# Patient Record
Sex: FEMALE | Race: WHITE | Employment: OTHER
[De-identification: names, ages, dates, MRNs, and addresses within clinical notes are randomized per-mention and may not be internally consistent; named-entity substitution may affect disease eponyms.]

---

## 2017-01-18 ENCOUNTER — OFFICE VISIT (OUTPATIENT)
Facility: CLINIC | Age: 77
End: 2017-01-18

## 2017-01-18 VITALS
HEART RATE: 72 BPM | TEMPERATURE: 98.6 F | DIASTOLIC BLOOD PRESSURE: 80 MMHG | OXYGEN SATURATION: 98 % | BODY MASS INDEX: 39.46 KG/M2 | WEIGHT: 176 LBS | SYSTOLIC BLOOD PRESSURE: 136 MMHG

## 2017-01-18 DIAGNOSIS — I10 ESSENTIAL HYPERTENSION: ICD-10-CM

## 2017-01-18 DIAGNOSIS — Z00.00 ROUTINE GENERAL MEDICAL EXAMINATION AT A HEALTH CARE FACILITY: ICD-10-CM

## 2017-01-18 DIAGNOSIS — E11.42 TYPE 2 DIABETES MELLITUS WITH PERIPHERAL NEUROPATHY (HCC): ICD-10-CM

## 2017-01-18 DIAGNOSIS — I25.10 CORONARY ARTERY DISEASE INVOLVING NATIVE CORONARY ARTERY OF NATIVE HEART WITHOUT ANGINA PECTORIS: ICD-10-CM

## 2017-01-18 DIAGNOSIS — Z23 NEEDS FLU SHOT: ICD-10-CM

## 2017-01-18 DIAGNOSIS — E66.9 OBESITY (BMI 30-39.9): ICD-10-CM

## 2017-01-18 DIAGNOSIS — M15.9 PRIMARY OSTEOARTHRITIS INVOLVING MULTIPLE JOINTS: ICD-10-CM

## 2017-01-18 DIAGNOSIS — Z00.00 MEDICARE ANNUAL WELLNESS VISIT, SUBSEQUENT: Primary | ICD-10-CM

## 2017-01-18 DIAGNOSIS — E78.5 DYSLIPIDEMIA: ICD-10-CM

## 2017-01-18 PROCEDURE — 90662 IIV NO PRSV INCREASED AG IM: CPT | Performed by: FAMILY MEDICINE

## 2017-01-18 PROCEDURE — G0439 PPPS, SUBSEQ VISIT: HCPCS | Performed by: FAMILY MEDICINE

## 2017-01-18 PROCEDURE — G0008 ADMIN INFLUENZA VIRUS VAC: HCPCS | Performed by: FAMILY MEDICINE

## 2017-01-18 ASSESSMENT — LIFESTYLE VARIABLES
HOW OFTEN DO YOU HAVE SIX OR MORE DRINKS ON ONE OCCASION: 0
HOW MANY STANDARD DRINKS CONTAINING ALCOHOL DO YOU HAVE ON A TYPICAL DAY: 0
AUDIT-C TOTAL SCORE: 1
HOW OFTEN DO YOU HAVE A DRINK CONTAINING ALCOHOL: 1

## 2017-01-18 ASSESSMENT — ANXIETY QUESTIONNAIRES: GAD7 TOTAL SCORE: 0

## 2017-01-18 ASSESSMENT — PATIENT HEALTH QUESTIONNAIRE - PHQ9: SUM OF ALL RESPONSES TO PHQ QUESTIONS 1-9: 0

## 2017-01-25 RX ORDER — DILTIAZEM HYDROCHLORIDE 300 MG/1
300 CAPSULE, COATED, EXTENDED RELEASE ORAL DAILY
Qty: 30 CAPSULE | Refills: 0 | Status: SHIPPED | OUTPATIENT
Start: 2017-01-25 | End: 2017-03-21

## 2017-01-26 ENCOUNTER — TELEPHONE (OUTPATIENT)
Facility: CLINIC | Age: 77
End: 2017-01-26

## 2017-01-26 RX ORDER — DILTIAZEM HYDROCHLORIDE 180 MG/1
180 CAPSULE, EXTENDED RELEASE ORAL DAILY
COMMUNITY
End: 2018-10-29 | Stop reason: SDUPTHER

## 2017-05-19 ENCOUNTER — HOSPITAL ENCOUNTER (OUTPATIENT)
Age: 77
Discharge: HOME | End: 2017-05-19
Payer: MEDICARE

## 2017-05-19 DIAGNOSIS — M15.9 PRIMARY OSTEOARTHRITIS INVOLVING MULTIPLE JOINTS: ICD-10-CM

## 2017-05-19 DIAGNOSIS — E55.9 VITAMIN D DEFICIENCY: ICD-10-CM

## 2017-05-19 DIAGNOSIS — I10 ESSENTIAL HYPERTENSION: ICD-10-CM

## 2017-05-19 DIAGNOSIS — E78.5 DYSLIPIDEMIA: ICD-10-CM

## 2017-05-19 DIAGNOSIS — E11.42 TYPE 2 DIABETES MELLITUS WITH PERIPHERAL NEUROPATHY (HCC): ICD-10-CM

## 2017-05-19 LAB
ALBUMIN SERPL-MCNC: 3.9 G/DL (ref 3.5–5.2)
ALBUMIN/GLOBULIN RATIO: ABNORMAL (ref 1–2.5)
ALP BLD-CCNC: 82 U/L (ref 35–104)
ALT SERPL-CCNC: 21 U/L (ref 5–33)
ANION GAP SERPL CALCULATED.3IONS-SCNC: 13 MMOL/L (ref 9–17)
AST SERPL-CCNC: 23 U/L
BILIRUB SERPL-MCNC: 0.84 MG/DL (ref 0.3–1.2)
BUN BLDV-MCNC: 10 MG/DL (ref 8–23)
BUN/CREAT BLD: ABNORMAL (ref 9–20)
CALCIUM SERPL-MCNC: 9.3 MG/DL (ref 8.6–10.4)
CHLORIDE BLD-SCNC: 101 MMOL/L (ref 98–107)
CHOLESTEROL/HDL RATIO: 2.7
CHOLESTEROL: 133 MG/DL
CO2: 27 MMOL/L (ref 20–31)
CREAT SERPL-MCNC: 0.51 MG/DL (ref 0.5–0.9)
GFR AFRICAN AMERICAN: >60 ML/MIN
GFR NON-AFRICAN AMERICAN: >60 ML/MIN
GFR SERPL CREATININE-BSD FRML MDRD: ABNORMAL ML/MIN/{1.73_M2}
GFR SERPL CREATININE-BSD FRML MDRD: ABNORMAL ML/MIN/{1.73_M2}
GLUCOSE BLD-MCNC: 145 MG/DL (ref 70–99)
HCT VFR BLD CALC: 40.2 % (ref 36–46)
HDLC SERPL-MCNC: 49 MG/DL
HEMOGLOBIN: 13.7 G/DL (ref 12–16)
LDL CHOLESTEROL: 64 MG/DL (ref 0–130)
MCH RBC QN AUTO: 30 PG (ref 26–34)
MCHC RBC AUTO-ENTMCNC: 34 G/DL (ref 31–37)
MCV RBC AUTO: 88.4 FL (ref 80–100)
PDW BLD-RTO: 14.2 % (ref 11.5–14.9)
PLATELET # BLD: 314 K/UL (ref 150–450)
PMV BLD AUTO: 8.8 FL (ref 6–12)
POTASSIUM SERPL-SCNC: 4.2 MMOL/L (ref 3.7–5.3)
RBC # BLD: 4.55 M/UL (ref 4–5.2)
SODIUM BLD-SCNC: 141 MMOL/L (ref 135–144)
TOTAL PROTEIN: 6.9 G/DL (ref 6.4–8.3)
TRIGL SERPL-MCNC: 100 MG/DL
VITAMIN D 25-HYDROXY: 36.8 NG/ML (ref 30–100)
VLDLC SERPL CALC-MCNC: NORMAL MG/DL (ref 1–30)
WBC # BLD: 10.4 K/UL (ref 3.5–11)

## 2017-05-19 PROCEDURE — 82306 VITAMIN D 25 HYDROXY: CPT

## 2017-05-19 PROCEDURE — 80053 COMPREHEN METABOLIC PANEL: CPT

## 2017-05-19 PROCEDURE — 36415 COLL VENOUS BLD VENIPUNCTURE: CPT

## 2017-05-19 PROCEDURE — 85027 COMPLETE CBC AUTOMATED: CPT

## 2017-05-19 PROCEDURE — 80061 LIPID PANEL: CPT

## 2018-08-10 ENCOUNTER — OFFICE VISIT (OUTPATIENT)
Dept: PODIATRY | Age: 78
End: 2018-08-10
Payer: MEDICARE

## 2018-08-10 VITALS
WEIGHT: 172 LBS | DIASTOLIC BLOOD PRESSURE: 73 MMHG | BODY MASS INDEX: 34.68 KG/M2 | SYSTOLIC BLOOD PRESSURE: 172 MMHG | HEIGHT: 59 IN | HEART RATE: 72 BPM

## 2018-08-10 DIAGNOSIS — E11.42 TYPE 2 DIABETES MELLITUS WITH PERIPHERAL NEUROPATHY (HCC): ICD-10-CM

## 2018-08-10 DIAGNOSIS — M79.674 PAIN OF TOES OF BOTH FEET: ICD-10-CM

## 2018-08-10 DIAGNOSIS — M79.675 PAIN OF TOES OF BOTH FEET: ICD-10-CM

## 2018-08-10 DIAGNOSIS — B35.1 ONYCHOMYCOSIS OF TOENAIL: Primary | ICD-10-CM

## 2018-08-10 PROCEDURE — 99999 PR OFFICE/OUTPT VISIT,PROCEDURE ONLY: CPT | Performed by: PODIATRIST

## 2018-08-10 PROCEDURE — 11721 DEBRIDE NAIL 6 OR MORE: CPT | Performed by: PODIATRIST

## 2018-08-13 ASSESSMENT — ENCOUNTER SYMPTOMS
NAUSEA: 0
COLOR CHANGE: 0
SHORTNESS OF BREATH: 0
DIARRHEA: 0
BACK PAIN: 0

## 2018-08-27 ENCOUNTER — NURSE ONLY (OUTPATIENT)
Dept: PODIATRY | Age: 78
End: 2018-08-27

## 2018-08-27 DIAGNOSIS — E11.42 TYPE 2 DIABETES MELLITUS WITH PERIPHERAL NEUROPATHY (HCC): Primary | ICD-10-CM

## 2018-09-13 ENCOUNTER — NURSE ONLY (OUTPATIENT)
Dept: PODIATRY | Age: 78
End: 2018-09-13
Payer: MEDICARE

## 2018-09-13 DIAGNOSIS — L84 PRE-ULCERATIVE CORN OR CALLOUS: ICD-10-CM

## 2018-09-13 DIAGNOSIS — E11.42 TYPE 2 DIABETES MELLITUS WITH PERIPHERAL NEUROPATHY (HCC): Primary | ICD-10-CM

## 2018-09-13 PROCEDURE — A5500 DIAB SHOE FOR DENSITY INSERT: HCPCS | Performed by: PODIATRIST

## 2018-09-13 PROCEDURE — A5513 MULTI DEN INSERT CUSTOM MOLD: HCPCS | Performed by: PODIATRIST

## 2018-09-13 NOTE — PROGRESS NOTES
Dispense 1 pair of Diabetic Shoes with 3 pairs of custom inserts. Appropriate paperwork was obtained from the primary care physician and the patient was fitted in the office.

## 2018-10-26 ENCOUNTER — HOSPITAL ENCOUNTER (OUTPATIENT)
Age: 78
Setting detail: SPECIMEN
Discharge: HOME | End: 2018-10-26
Payer: MEDICARE

## 2018-10-26 DIAGNOSIS — E11.9 TYPE 2 DIABETES MELLITUS WITHOUT COMPLICATION, WITHOUT LONG-TERM CURRENT USE OF INSULIN (HCC): ICD-10-CM

## 2018-10-26 DIAGNOSIS — E78.5 DYSLIPIDEMIA: ICD-10-CM

## 2018-10-26 DIAGNOSIS — I10 ESSENTIAL HYPERTENSION: ICD-10-CM

## 2018-10-26 DIAGNOSIS — E55.9 VITAMIN D DEFICIENCY: ICD-10-CM

## 2018-10-26 LAB
ALBUMIN SERPL-MCNC: 3.8 G/DL (ref 3.5–5.2)
ALBUMIN/GLOBULIN RATIO: 1.4 (ref 1–2.5)
ALP BLD-CCNC: 78 U/L (ref 35–104)
ALT SERPL-CCNC: 22 U/L (ref 5–33)
ANION GAP SERPL CALCULATED.3IONS-SCNC: 10 MMOL/L (ref 9–17)
AST SERPL-CCNC: 17 U/L
BILIRUB SERPL-MCNC: 0.6 MG/DL (ref 0.3–1.2)
BILIRUBIN DIRECT: 0.16 MG/DL
BILIRUBIN, INDIRECT: 0.44 MG/DL (ref 0–1)
BUN BLDV-MCNC: 17 MG/DL (ref 8–23)
CALCIUM SERPL-MCNC: 9.5 MG/DL (ref 8.6–10.4)
CHLORIDE BLD-SCNC: 100 MMOL/L (ref 98–107)
CHOLESTEROL/HDL RATIO: 2.9
CHOLESTEROL: 123 MG/DL
CO2: 27 MMOL/L (ref 20–31)
CREAT SERPL-MCNC: 0.54 MG/DL (ref 0.5–0.9)
GFR AFRICAN AMERICAN: >60 ML/MIN
GFR NON-AFRICAN AMERICAN: >60 ML/MIN
GFR SERPL CREATININE-BSD FRML MDRD: ABNORMAL ML/MIN/{1.73_M2}
GFR SERPL CREATININE-BSD FRML MDRD: ABNORMAL ML/MIN/{1.73_M2}
GLUCOSE BLD-MCNC: 154 MG/DL (ref 70–99)
HCT VFR BLD CALC: 41.8 % (ref 36.3–47.1)
HDLC SERPL-MCNC: 42 MG/DL
HEMOGLOBIN: 13.4 G/DL (ref 11.9–15.1)
LDL CHOLESTEROL: 69 MG/DL (ref 0–130)
MCH RBC QN AUTO: 30 PG (ref 25.2–33.5)
MCHC RBC AUTO-ENTMCNC: 32.1 G/DL (ref 28.4–34.8)
MCV RBC AUTO: 93.7 FL (ref 82.6–102.9)
NRBC AUTOMATED: 0 PER 100 WBC
PDW BLD-RTO: 14.6 % (ref 11.8–14.4)
PLATELET # BLD: 326 K/UL (ref 138–453)
PMV BLD AUTO: 10.8 FL (ref 8.1–13.5)
POTASSIUM SERPL-SCNC: 4.4 MMOL/L (ref 3.7–5.3)
RBC # BLD: 4.46 M/UL (ref 3.95–5.11)
SODIUM BLD-SCNC: 137 MMOL/L (ref 135–144)
TOTAL PROTEIN: 6.6 G/DL (ref 6.4–8.3)
TRIGL SERPL-MCNC: 62 MG/DL
VITAMIN D 25-HYDROXY: 44.8 NG/ML (ref 30–100)
VLDLC SERPL CALC-MCNC: NORMAL MG/DL (ref 1–30)
WBC # BLD: 10.3 K/UL (ref 3.5–11.3)

## 2018-11-05 ENCOUNTER — OFFICE VISIT (OUTPATIENT)
Dept: PODIATRY | Age: 78
End: 2018-11-05
Payer: MEDICARE

## 2018-11-05 VITALS — HEIGHT: 59 IN | BODY MASS INDEX: 35.48 KG/M2 | WEIGHT: 176 LBS

## 2018-11-05 DIAGNOSIS — M79.675 PAIN OF TOES OF BOTH FEET: ICD-10-CM

## 2018-11-05 DIAGNOSIS — B35.1 ONYCHOMYCOSIS OF TOENAIL: Primary | ICD-10-CM

## 2018-11-05 DIAGNOSIS — M79.674 PAIN OF TOES OF BOTH FEET: ICD-10-CM

## 2018-11-05 DIAGNOSIS — E11.42 TYPE 2 DIABETES MELLITUS WITH PERIPHERAL NEUROPATHY (HCC): ICD-10-CM

## 2018-11-05 PROCEDURE — 99999 PR OFFICE/OUTPT VISIT,PROCEDURE ONLY: CPT | Performed by: PODIATRIST

## 2018-11-05 PROCEDURE — 11721 DEBRIDE NAIL 6 OR MORE: CPT | Performed by: PODIATRIST

## 2018-11-08 ASSESSMENT — ENCOUNTER SYMPTOMS
COLOR CHANGE: 0
BACK PAIN: 0
SHORTNESS OF BREATH: 0
DIARRHEA: 0
NAUSEA: 0

## 2018-11-09 NOTE — PROGRESS NOTES
SUBJECTIVE: Jose King is a 69 y.o. female who returns to the office with chief complaint of painful fungal toenails. Patient relates toe nails are thickened/difficult to trim as well as painful with ambulation and with shoe gear. Chief Complaint   Patient presents with    Nail Problem     painful nails/trim/DFC    Other     pcp Mima Villarreal/last visit 10/29/18     Review of Systems   Constitutional: Negative for activity change, appetite change, chills, diaphoresis, fatigue and fever. Respiratory: Negative for shortness of breath. Cardiovascular: Negative for leg swelling. Gastrointestinal: Negative for diarrhea and nausea. Endocrine: Negative for cold intolerance, heat intolerance and polyuria. Musculoskeletal: Positive for arthralgias. Negative for back pain, gait problem, joint swelling and myalgias. Skin: Negative for color change, pallor, rash and wound. Allergic/Immunologic: Negative for environmental allergies and food allergies. Neurological: Negative for dizziness, weakness, light-headedness and numbness. Hematological: Does not bruise/bleed easily. Psychiatric/Behavioral: Negative for behavioral problems, confusion and self-injury. The patient is not nervous/anxious. OBJECTIVE: Clinical evaluation of patient reveals nails 1,2,3,4,5 of the right foot and nails 1,2,3,4,5, of the left foot to present with thickness, elongation, discoloration, brittleness, and subungual debris. There was pain with palpation and debridement of the toenails of the bilateral feet. No open lesions noted to either foot today. The right DP pulse is palpable. The left DP pulse is palpable. The right PT pulse is palpable. The left PT pulse is palpable. Protective sensation is present to the right plantar foot as noted with a 5.07 [place] monofilament. Protective sensation is present to the left plantar foot as noted with a 5.07 [place] monofilament.    Glucose:

## 2019-07-17 ENCOUNTER — HOSPITAL ENCOUNTER (OUTPATIENT)
Facility: CLINIC | Age: 79
Discharge: HOME | End: 2019-07-19
Payer: MEDICARE

## 2019-07-17 ENCOUNTER — HOSPITAL ENCOUNTER (OUTPATIENT)
Dept: GENERAL RADIOLOGY | Facility: CLINIC | Age: 79
Discharge: HOME | End: 2019-07-19
Payer: MEDICARE

## 2019-07-17 DIAGNOSIS — J40 BRONCHITIS: ICD-10-CM

## 2019-07-17 PROCEDURE — 71046 X-RAY EXAM CHEST 2 VIEWS: CPT

## 2019-12-18 ENCOUNTER — OFFICE VISIT (OUTPATIENT)
Dept: DERMATOLOGY | Age: 79
End: 2019-12-18
Payer: MEDICARE

## 2019-12-18 VITALS
HEART RATE: 68 BPM | DIASTOLIC BLOOD PRESSURE: 79 MMHG | OXYGEN SATURATION: 95 % | HEIGHT: 59 IN | BODY MASS INDEX: 31.77 KG/M2 | WEIGHT: 157.6 LBS | SYSTOLIC BLOOD PRESSURE: 175 MMHG

## 2019-12-18 DIAGNOSIS — L30.9 HAND DERMATITIS: Primary | ICD-10-CM

## 2019-12-18 PROCEDURE — 99202 OFFICE O/P NEW SF 15 MIN: CPT | Performed by: DERMATOLOGY

## 2019-12-18 PROCEDURE — 1090F PRES/ABSN URINE INCON ASSESS: CPT | Performed by: DERMATOLOGY

## 2019-12-18 PROCEDURE — G8482 FLU IMMUNIZE ORDER/ADMIN: HCPCS | Performed by: DERMATOLOGY

## 2019-12-18 PROCEDURE — G8598 ASA/ANTIPLAT THER USED: HCPCS | Performed by: DERMATOLOGY

## 2019-12-18 PROCEDURE — 4040F PNEUMOC VAC/ADMIN/RCVD: CPT | Performed by: DERMATOLOGY

## 2019-12-18 PROCEDURE — 1123F ACP DISCUSS/DSCN MKR DOCD: CPT | Performed by: DERMATOLOGY

## 2019-12-18 PROCEDURE — 1036F TOBACCO NON-USER: CPT | Performed by: DERMATOLOGY

## 2019-12-18 PROCEDURE — G8427 DOCREV CUR MEDS BY ELIG CLIN: HCPCS | Performed by: DERMATOLOGY

## 2019-12-18 PROCEDURE — G8399 PT W/DXA RESULTS DOCUMENT: HCPCS | Performed by: DERMATOLOGY

## 2019-12-18 PROCEDURE — G8417 CALC BMI ABV UP PARAM F/U: HCPCS | Performed by: DERMATOLOGY

## 2019-12-18 RX ORDER — PREDNISONE 20 MG/1
TABLET ORAL
Qty: 25 TABLET | Refills: 0 | Status: SHIPPED | OUTPATIENT
Start: 2019-12-18 | End: 2020-06-16 | Stop reason: ALTCHOICE

## 2019-12-18 RX ORDER — BETAMETHASONE DIPROPIONATE 0.5 MG/G
OINTMENT TOPICAL
Qty: 50 G | Refills: 2 | Status: SHIPPED | OUTPATIENT
Start: 2019-12-18 | End: 2021-03-29

## 2019-12-18 RX ORDER — TACROLIMUS 1 MG/G
OINTMENT TOPICAL
Qty: 30 G | Refills: 2 | Status: SHIPPED | OUTPATIENT
Start: 2019-12-18 | End: 2021-03-29

## 2019-12-19 ENCOUNTER — TELEPHONE (OUTPATIENT)
Dept: DERMATOLOGY | Age: 79
End: 2019-12-19

## 2019-12-19 DIAGNOSIS — L30.9 HAND DERMATITIS: Primary | ICD-10-CM

## 2019-12-19 RX ORDER — BETAMETHASONE DIPROPIONATE 0.5 MG/G
CREAM TOPICAL
Qty: 50 G | Refills: 2 | Status: SHIPPED | OUTPATIENT
Start: 2019-12-19 | End: 2020-01-18

## 2020-01-30 ENCOUNTER — OFFICE VISIT (OUTPATIENT)
Dept: DERMATOLOGY | Age: 80
End: 2020-01-30
Payer: MEDICARE

## 2020-01-30 VITALS
DIASTOLIC BLOOD PRESSURE: 71 MMHG | SYSTOLIC BLOOD PRESSURE: 161 MMHG | WEIGHT: 158 LBS | HEART RATE: 68 BPM | HEIGHT: 59 IN | BODY MASS INDEX: 31.85 KG/M2

## 2020-01-30 PROCEDURE — G8482 FLU IMMUNIZE ORDER/ADMIN: HCPCS | Performed by: DERMATOLOGY

## 2020-01-30 PROCEDURE — G8399 PT W/DXA RESULTS DOCUMENT: HCPCS | Performed by: DERMATOLOGY

## 2020-01-30 PROCEDURE — 1036F TOBACCO NON-USER: CPT | Performed by: DERMATOLOGY

## 2020-01-30 PROCEDURE — 4040F PNEUMOC VAC/ADMIN/RCVD: CPT | Performed by: DERMATOLOGY

## 2020-01-30 PROCEDURE — 1090F PRES/ABSN URINE INCON ASSESS: CPT | Performed by: DERMATOLOGY

## 2020-01-30 PROCEDURE — 99213 OFFICE O/P EST LOW 20 MIN: CPT | Performed by: DERMATOLOGY

## 2020-01-30 PROCEDURE — G8427 DOCREV CUR MEDS BY ELIG CLIN: HCPCS | Performed by: DERMATOLOGY

## 2020-01-30 PROCEDURE — 1123F ACP DISCUSS/DSCN MKR DOCD: CPT | Performed by: DERMATOLOGY

## 2020-01-30 PROCEDURE — G8417 CALC BMI ABV UP PARAM F/U: HCPCS | Performed by: DERMATOLOGY

## 2020-01-30 RX ORDER — BETAMETHASONE DIPROPIONATE 0.5 MG/G
CREAM TOPICAL
Qty: 45 G | Refills: 2 | Status: SHIPPED | OUTPATIENT
Start: 2020-01-30 | End: 2020-04-13 | Stop reason: SDUPTHER

## 2020-01-30 NOTE — PROGRESS NOTES
Dermatology Patient Note  [facility] PHYSICIANS  [facility] DERMATOLOG[facility]  [address] [address]  Dept: [phone]  Dept Fax: [phone]      VISITDATE: 1/30/2020   REFERRING PROVIDER: No ref. provider found      Lana Mccormack is a 67 y.o. female  who presents today in the office for:    Follow-up (finished oral steroid; using ointment that is in \"purple\" tube twice daily; feels ointment works better than cream)      HISTORY OF PRESENT ILLNESS:  Patient presents for f/u hand dermatitis  Interval history: almost completely clear, only a few irritated areas left on right hand  Current treatment and adherence: finished steroid taper, using betamethasone ointment currently. Protopic was not as helpful. The beta ointment was very expensive (she spent nearly $300 on initial prescriptions), but the beta cream I sent in as an alternative the day after her appt was cheaper. She is still using the ointment currently but has a tube of cream.  Prior treatments tried: IMK and clobetasol      CURRENT MEDICATIONS:   Current Outpatient Medications   Medication Sig Dispense Refill    betamethasone dipropionate (DIPROLENE) 0.05 % cream Apply topically 2 times daily. 45 g 2    Blood Glucose Monitoring Suppl (ACCU-CHEK GRAYSON PLUS) w/Device KIT TEST ONE TIME DAILY 1 kit 0    tiZANidine (ZANAFLEX) 4 MG tablet TAKE 1 TABLET THREE TIMES DAILY AS NEEDED 270 tablet 0    SITagliptin (JANUVIA) 100 MG tablet TAKE 1 TABLET BY MOUTH DAILY 90 tablet 1    blood glucose monitor strips Test 2 times a day & as needed for symptoms of irregular blood glucose.  100 strip 0    ACCU-CHEK FASTCLIX LANCETS MISC TEST EVERY  each 3    blood glucose test strips (ACCU-CHEK SMARTVIEW) strip TEST EVERY  strip 3    aspirin 81 MG tablet Take 81 mg by mouth daily      Alcohol Swabs (B-D SINGLE USE SWABS REGULAR) PADS USE AS DIRECTED 300 each 5    metFORMIN (GLUCOPHAGE-XR) 500 MG extended release tablet TAKE 2 malaise. PHYSICAL EXAM:   BP (!) 161/71 (Site: Left Upper Arm, Position: Sitting, Cuff Size: Large Adult)   Pulse 68   Ht 4' 11\" (1.499 m)   Wt 158 lb (71.7 kg)   BMI 31.91 kg/m²     General Exam:  General Appearance: No acute distress, Well nourished     Neuro: Alert and oriented to person, place and time  Psych: Normal affect   Lymph Node: Not performed    Cutaneous Exam: Performed as documented in clinic note below. Sun-exposed skin, which includes the head/face, neck, both arms, digits and/or nails was examined. Pertinent Physical Exam Findings:  Physical Exam  Right hand with thin scaly plaque in palm, otherwise clear    Photo surveillance performed: No    Medical Necessity of Exam Performed:   Distribution of patient concerns    Additional Diagnostic Testing performed during exam: Not performed ,  Not performed    ASSESSMENT:   Diagnosis Orders   1. Hand dermatitis  betamethasone dipropionate (DIPROLENE) 0.05 % cream       Plan of Action is as Follows:  Assessment   1. Hand dermatitis, nearly clear  - continue betamethasone and protopic as needed for active rash only  - f/u 3 months. If inadequate control will discuss dupixent  - betamethasone dipropionate (DIPROLENE) 0.05 % cream; Apply topically 2 times daily. Dispense: 45 g; Refill: 2    RTC 3 months            Patient Instructions   -Continue the Betamethasone  -Continue the Protopic  -Follow up in 3 months      Follow-up: No follow-ups on file. This note was created with the assistance of a speech-recognition program.  Although the intention is to generate a document that actually reflects the content of the visit, no guarantees can be provided that every mistake has been identified and corrected byediting.     Electronically signed by Paige Mcleod MD on 1/30/20 at 3:02 PM

## 2020-04-13 ENCOUNTER — TELEPHONE (OUTPATIENT)
Dept: DERMATOLOGY | Age: 80
End: 2020-04-13

## 2020-04-13 RX ORDER — BETAMETHASONE DIPROPIONATE 0.5 MG/G
CREAM TOPICAL
Qty: 45 G | Refills: 2 | Status: SHIPPED | OUTPATIENT
Start: 2020-04-13 | End: 2020-04-13

## 2020-04-13 RX ORDER — BETAMETHASONE DIPROPIONATE 0.5 MG/G
CREAM TOPICAL
Qty: 135 G | Refills: 2 | Status: SHIPPED | OUTPATIENT
Start: 2020-04-13 | End: 2021-03-29

## 2020-04-13 NOTE — TELEPHONE ENCOUNTER
Pt needs betamethasone refill to [facility] mail order pharmacy (90 day please). States her hands are cracking and itching. I discussed her wearing cotton gloves--she does that regularly. Discussed wearing rubber gloves over cotton gloves when doing dishes. Stated understanding. Also advised her to keep anti itch lotion she uses in the fridge and apply cold. She will try that as well. She does not have a smartphone--in fact she lost her last cell phone. She will not be able to do VV and I advised her her upcoming appt will be cancelled and we will call to r/s in office visit once covid protocols are lifted. Stated understanding.

## 2020-06-01 ENCOUNTER — HOSPITAL ENCOUNTER (OUTPATIENT)
Age: 80
Setting detail: SPECIMEN
Discharge: HOME | End: 2020-06-01
Payer: MEDICARE

## 2020-06-01 LAB
ALBUMIN SERPL-MCNC: 3.8 G/DL (ref 3.5–5.2)
ALBUMIN/GLOBULIN RATIO: 1.5 (ref 1–2.5)
ALP BLD-CCNC: 76 U/L (ref 35–104)
ALT SERPL-CCNC: 18 U/L (ref 5–33)
ANION GAP SERPL CALCULATED.3IONS-SCNC: 14 MMOL/L (ref 9–17)
AST SERPL-CCNC: 20 U/L
BILIRUB SERPL-MCNC: 0.67 MG/DL (ref 0.3–1.2)
BUN BLDV-MCNC: 15 MG/DL (ref 8–23)
BUN/CREAT BLD: ABNORMAL (ref 9–20)
CALCIUM SERPL-MCNC: 9.4 MG/DL (ref 8.6–10.4)
CHLORIDE BLD-SCNC: 99 MMOL/L (ref 98–107)
CHOLESTEROL/HDL RATIO: 2.9
CHOLESTEROL: 141 MG/DL
CO2: 26 MMOL/L (ref 20–31)
CREAT SERPL-MCNC: 0.52 MG/DL (ref 0.5–0.9)
GFR AFRICAN AMERICAN: >60 ML/MIN
GFR NON-AFRICAN AMERICAN: >60 ML/MIN
GFR SERPL CREATININE-BSD FRML MDRD: ABNORMAL ML/MIN/{1.73_M2}
GFR SERPL CREATININE-BSD FRML MDRD: ABNORMAL ML/MIN/{1.73_M2}
GLUCOSE BLD-MCNC: 112 MG/DL (ref 70–99)
HCT VFR BLD CALC: 39 % (ref 36.3–47.1)
HDLC SERPL-MCNC: 49 MG/DL
HEMOGLOBIN: 12.6 G/DL (ref 11.9–15.1)
LDL CHOLESTEROL: 67 MG/DL (ref 0–130)
MCH RBC QN AUTO: 30.6 PG (ref 25.2–33.5)
MCHC RBC AUTO-ENTMCNC: 32.3 G/DL (ref 28.4–34.8)
MCV RBC AUTO: 94.7 FL (ref 82.6–102.9)
NRBC AUTOMATED: 0 PER 100 WBC
PDW BLD-RTO: 13.6 % (ref 11.8–14.4)
PLATELET # BLD: 288 K/UL (ref 138–453)
PMV BLD AUTO: 11.3 FL (ref 8.1–13.5)
POTASSIUM SERPL-SCNC: 4.1 MMOL/L (ref 3.7–5.3)
RBC # BLD: 4.12 M/UL (ref 3.95–5.11)
SODIUM BLD-SCNC: 139 MMOL/L (ref 135–144)
TOTAL PROTEIN: 6.3 G/DL (ref 6.4–8.3)
TRIGL SERPL-MCNC: 124 MG/DL
VLDLC SERPL CALC-MCNC: NORMAL MG/DL (ref 1–30)
WBC # BLD: 7.6 K/UL (ref 3.5–11.3)

## 2020-06-16 ENCOUNTER — OFFICE VISIT (OUTPATIENT)
Dept: DERMATOLOGY | Age: 80
End: 2020-06-16
Payer: MEDICARE

## 2020-06-16 VITALS
DIASTOLIC BLOOD PRESSURE: 69 MMHG | SYSTOLIC BLOOD PRESSURE: 156 MMHG | WEIGHT: 156.6 LBS | BODY MASS INDEX: 31.57 KG/M2 | HEART RATE: 87 BPM | HEIGHT: 59 IN | OXYGEN SATURATION: 97 % | TEMPERATURE: 97.3 F

## 2020-06-16 PROCEDURE — 1123F ACP DISCUSS/DSCN MKR DOCD: CPT | Performed by: DERMATOLOGY

## 2020-06-16 PROCEDURE — 1036F TOBACCO NON-USER: CPT | Performed by: DERMATOLOGY

## 2020-06-16 PROCEDURE — G8417 CALC BMI ABV UP PARAM F/U: HCPCS | Performed by: DERMATOLOGY

## 2020-06-16 PROCEDURE — 4040F PNEUMOC VAC/ADMIN/RCVD: CPT | Performed by: DERMATOLOGY

## 2020-06-16 PROCEDURE — 99212 OFFICE O/P EST SF 10 MIN: CPT | Performed by: DERMATOLOGY

## 2020-06-16 PROCEDURE — 1090F PRES/ABSN URINE INCON ASSESS: CPT | Performed by: DERMATOLOGY

## 2020-06-16 PROCEDURE — G8427 DOCREV CUR MEDS BY ELIG CLIN: HCPCS | Performed by: DERMATOLOGY

## 2020-06-16 PROCEDURE — G8399 PT W/DXA RESULTS DOCUMENT: HCPCS | Performed by: DERMATOLOGY

## 2020-06-16 NOTE — PATIENT INSTRUCTIONS
Hand washing and moisturizin. Use lukewarm water  2. Use as little soap as necessary. Dove bar soap or cetaphil liquid cleansers are safe. 3. After washing, pat hands dry, especially between the fingers  4. Within 3 minutes of drying hands, apply Vaseline or Aquaphor ointment. It is helpful to have a supply next to every sink, next to the bed, in the car, and at work. 5. Moisturizers should be applied to the hands at least 10 times per day. 6. Do not use washcloths and do not rub or scrub the hands    When hands must be in water:  1. Wear cotton gloves under vinyl gloves  2. Do not use hot water. 3. Restrict hand exposure to water to less than 15 minutes at a time. 4. Use running water rather than submerge hands into water. Patient Information  DUPIXENT® (DU-pix-ent) (dupilumab) Injection,  for subcutaneous use    What is DUPIXENT? ? Willie Pinna is a prescription medicine used to treat adults with moderate-to-severe atopic dermatitis (eczema) that is not well controlled  with prescription therapies used on the skin (topical), or who cannot use topical therapies. ? Willie Pinna can be used with or without topical corticosteroids. ? It is not known if DUPIXENT is safe and effective in children. Do not use DUPIXENT if you are allergic to dupilumab or to any of the ingredients in Willie Pinna. See the end of this leaflet for a complete  list of ingredients in Willie Pinna. Before using Willie Pinna, tell your healthcare provider about all your medical conditions, including if you:  ? have eye problems  ? have a parasitic (helminth) infection  ? have asthma  ? are scheduled to receive any vaccinations. You should not receive a ?live vaccine? if you are treated with Willie Pinna. ? are pregnant or plan to become pregnant. It is not known whether Willie Pinna will harm your unborn baby. ? are breastfeeding or plan to breastfeed. It is not known whether Willie Pinna passes into your breast milk.   Tell your healthcare provider about

## 2020-06-16 NOTE — PROGRESS NOTES
(dupilumab) Injection,  for subcutaneous use    What is DUPIXENT? ? Safford Romp is a prescription medicine used to treat adults with moderate-to-severe atopic dermatitis (eczema) that is not well controlled  with prescription therapies used on the skin (topical), or who cannot use topical therapies. ? Safford Romp can be used with or without topical corticosteroids. ? It is not known if DUPIXENT is safe and effective in children. Do not use DUPIXENT if you are allergic to dupilumab or to any of the ingredients in Safford Romp. See the end of this leaflet for a complete  list of ingredients in Safford Romp. Before using Safford Romp, tell your healthcare provider about all your medical conditions, including if you:  ? have eye problems  ? have a parasitic (helminth) infection  ? have asthma  ? are scheduled to receive any vaccinations. You should not receive a ?live vaccine? if you are treated with Safford Romp. ? are pregnant or plan to become pregnant. It is not known whether Safford Romp will harm your unborn baby. ? are breastfeeding or plan to breastfeed. It is not known whether Safford Romp passes into your breast milk. Tell your healthcare provider about all of the medicines you take, including prescription and over-the-counter medicines, vitamins, and herbal  supplements. If you have asthma and are taking asthma medicines, do not change or stop your asthma medicine without talking to your  healthcare provider. How should I use DUPIXENT? ? See the detailed ? Instructions for Use? that comes with Safford Romp for information on how to prepare and inject DUPIXENT and  how to properly store and throw away (dispose of) used DUPIXENT pre-filled syringes. ? Use DUPIXENT exactly as prescribed by your healthcare provider. ? Safford Romp comes as a single-dose pre-filled syringe with needle shield. ? Safford Romp is given as an injection under the skin (subcutaneous injection).   ? If your healthcare provider decides that you or a caregiver can give the injections of DUPIXENT, you or your caregiver should receive  training on the right way to prepare and inject Kyleview. Do not try to inject Kyleview until you have been shown the right way by  your healthcare provider. ? If you miss a dose of DUPIXENT, give the injection within 7 days from the missed dose, then continue with the original schedule. If the  missed dose is not given within 7 days, wait until the next scheduled dose to give your DUPIXENT injection. ? If you inject more DUPIXENT than prescribed, call your healthcare provider right away. ? Your healthcare provider may prescribe other topical medicines to use with Kyleview. Use the other prescribed topical medicines  exactly as your healthcare provider tells you to. What are the possible side effects of DUPIXENT? DUPIXENT can cause serious side effects, including:  ? Allergic reactions. Stop using Kyleview and go to the nearest hospital emergency room if you get any of the following symptoms:   fever   general ill feeling   swollen lymph nodes   hives   itching   joint pain   skin rash  ? Eye problems. Tell your healthcare provider if you have any new or worsening eye problems, including eye pain or changes in vision. The most common side effects of DUPIXENT include:  ? injection site reactions  ? eye and eyelid inflammation, including redness, swelling and itching  ? cold sores in your mouth or on your lips  Tell your healthcare provider if you have any side effect that bothers you or that does not go away. These are not all of the possible side effects of DUPIXENT. Call your doctor for medical advice about side effects. You may report side effects to FDA at 8-618-FDA-0006. General information about the safe and effective use of DUPIXENT. Medicines are sometimes prescribed for purposes other than those listed in a Patient Information leaflet. Do not use DUPIXENT for a  condition for which it was not prescribed.  Do not give

## 2020-06-25 ENCOUNTER — TELEPHONE (OUTPATIENT)
Dept: DERMATOLOGY | Age: 80
End: 2020-06-25

## 2020-06-26 RX ORDER — DUPILUMAB 300 MG/2ML
600 INJECTION, SOLUTION SUBCUTANEOUS ONCE
Qty: 4 ML | Refills: 0 | Status: SHIPPED | OUTPATIENT
Start: 2020-06-26 | End: 2020-06-26

## 2020-06-26 RX ORDER — DUPILUMAB 300 MG/2ML
INJECTION, SOLUTION SUBCUTANEOUS
Qty: 4 ML | Refills: 2 | Status: SHIPPED | OUTPATIENT
Start: 2020-06-26 | End: 2021-04-07 | Stop reason: SDUPTHER

## 2020-06-26 NOTE — TELEPHONE ENCOUNTER
It can be filled at [facility].  The Copay is $937 they will try to help her with the copay from [facility]

## 2020-07-22 ENCOUNTER — TELEPHONE (OUTPATIENT)
Dept: DERMATOLOGY | Age: 80
End: 2020-07-22

## 2020-07-22 NOTE — TELEPHONE ENCOUNTER
Pt called stating [address] never received the enrollment from for [address]. Please advise.

## 2020-07-28 ENCOUNTER — TELEPHONE (OUTPATIENT)
Dept: DERMATOLOGY | Age: 80
End: 2020-07-28

## 2020-07-28 RX ORDER — DUPILUMAB 300 MG/2ML
INJECTION, SOLUTION SUBCUTANEOUS
Qty: 4 ML | Refills: 2 | Status: SHIPPED | OUTPATIENT
Start: 2020-07-28 | End: 2020-10-19

## 2020-07-28 RX ORDER — DUPILUMAB 300 MG/2ML
600 INJECTION, SOLUTION SUBCUTANEOUS ONCE
Qty: 4 ML | Refills: 0 | Status: SHIPPED | OUTPATIENT
Start: 2020-07-28 | End: 2020-07-28

## 2020-11-18 ENCOUNTER — TELEPHONE (OUTPATIENT)
Dept: DERMATOLOGY | Age: 80
End: 2020-11-18

## 2020-11-24 ENCOUNTER — TELEPHONE (OUTPATIENT)
Dept: DERMATOLOGY | Age: 80
End: 2020-11-24

## 2020-11-24 NOTE — TELEPHONE ENCOUNTER
Spoke with Tamika regarding pt Dupixent prescription. Gave pharmacy a verbal script. Pharmacy states that they will fill and send prescription to pt.

## 2020-11-24 NOTE — TELEPHONE ENCOUNTER
Pt called stating that she is out of her [address] and spoke with someone at the pharmacy and someone from the office can call the prescription in.

## 2021-02-08 ENCOUNTER — NURSE TRIAGE (OUTPATIENT)
Dept: OTHER | Facility: CLINIC | Age: 81
End: 2021-02-08

## 2021-02-08 NOTE — TELEPHONE ENCOUNTER
Reason for Disposition   COVID-19 vaccine, Frequently Asked Questions (FAQs)    Answer Assessment - Initial Assessment Questions  1. MAIN CONCERN OR SYMPTOM:  \"What is your main concern right now? \" \"What question do you have? \" \"What's the main symptom you're worried about? \" (e.g., fever, pain, redness, swelling)      \"Can I receive the Covid-19 vaccine with all of my medical problems?\"    2. VACCINE: \"What vaccination did you receive? \" \"Is this your first or second shot? \" (e.g., none; Danigemma Plummer, other)      NA    3. SYMPTOM ONSET: \"When did the *No Answer* begin? \" (e.g., not relevant; hours, days)       NA    4. SYMPTOM SEVERITY: \"How bad is it? \"       NA    5. FEVER: \"Is there a fever? \" If so, ask: \"What is it, how was it measured, and when did it start? \"       NA    6. PAST REACTIONS: \"Have you reacted to immunizations before? \" If so, ask: \"What happened? \"      NA    7. OTHER SYMPTOMS: \"Do you have any other symptoms? \"      NA    Protocols used: CORONAVIRUS (COVID-19) VACCINE QUESTIONS AND REACTIONS-ADULT-[place]    [facility] [place] [address] [facility] Brief description of triage: Patient asking if she should receive the Covid-19 vaccine due to her medical problems. Patient advised that she has no contraindications to the vaccine. Patient given scheduling number for vaccine hotline. Care advice provided. Recommended using local department of health website for testing locations and up to date information. Caller verbalizes understanding, denies any other questions or concerns; instructed to call back for any new or worsening symptoms.

## 2021-02-10 NOTE — TELEPHONE ENCOUNTER
Pt called back, asking to speak with [place] Writer offered to take a message but pt declined stating she \"would like to go into great detail with [place] \"

## 2021-02-25 ENCOUNTER — TELEPHONE (OUTPATIENT)
Dept: DERMATOLOGY | Age: 81
End: 2021-02-25

## 2021-02-25 DIAGNOSIS — L20.84 INTRINSIC ATOPIC DERMATITIS: Primary | ICD-10-CM

## 2021-02-25 RX ORDER — DUPILUMAB 300 MG/2ML
INJECTION, SOLUTION SUBCUTANEOUS
Qty: 4 ML | Refills: 3 | Status: SHIPPED | OUTPATIENT
Start: 2021-02-25 | End: 2021-03-29

## 2021-02-25 NOTE — TELEPHONE ENCOUNTER
Pt called our office for another update on her [address] as she has been out for a couple weeks now. I called [facility], they stated [facility] Medicare doesn't allow them to send in PA's, attempted to contact [facility] via phone but, the PA department stated their Marlee Garces is down\" and for me to call back in a few hours. I sent in the [facility] Prescription Determination form to [facility] f: [phone] and it went through, marked urgent/expedited review. I also faxed new clinical notes through [facility] to [facility]. I informed pt of all of this, she is very grateful. I told her I will take care of this while [place] is off and update the pt when I can.

## 2021-04-07 ENCOUNTER — OFFICE VISIT (OUTPATIENT)
Dept: DERMATOLOGY | Age: 81
End: 2021-04-07
Payer: MEDICARE

## 2021-04-07 VITALS
HEIGHT: 59 IN | SYSTOLIC BLOOD PRESSURE: 184 MMHG | HEART RATE: 70 BPM | OXYGEN SATURATION: 95 % | WEIGHT: 157 LBS | BODY MASS INDEX: 31.65 KG/M2 | DIASTOLIC BLOOD PRESSURE: 77 MMHG

## 2021-04-07 DIAGNOSIS — L20.84 INTRINSIC ATOPIC DERMATITIS: ICD-10-CM

## 2021-04-07 PROCEDURE — G8399 PT W/DXA RESULTS DOCUMENT: HCPCS | Performed by: DERMATOLOGY

## 2021-04-07 PROCEDURE — 1090F PRES/ABSN URINE INCON ASSESS: CPT | Performed by: DERMATOLOGY

## 2021-04-07 PROCEDURE — G8427 DOCREV CUR MEDS BY ELIG CLIN: HCPCS | Performed by: DERMATOLOGY

## 2021-04-07 PROCEDURE — G8417 CALC BMI ABV UP PARAM F/U: HCPCS | Performed by: DERMATOLOGY

## 2021-04-07 PROCEDURE — 4040F PNEUMOC VAC/ADMIN/RCVD: CPT | Performed by: DERMATOLOGY

## 2021-04-07 PROCEDURE — 99213 OFFICE O/P EST LOW 20 MIN: CPT | Performed by: DERMATOLOGY

## 2021-04-07 PROCEDURE — 1036F TOBACCO NON-USER: CPT | Performed by: DERMATOLOGY

## 2021-04-07 PROCEDURE — 1123F ACP DISCUSS/DSCN MKR DOCD: CPT | Performed by: DERMATOLOGY

## 2021-04-07 RX ORDER — DUPILUMAB 300 MG/2ML
INJECTION, SOLUTION SUBCUTANEOUS
Qty: 4 ML | Refills: 5 | Status: SHIPPED | OUTPATIENT
Start: 2021-04-07 | End: 2021-08-06

## 2021-04-07 NOTE — PATIENT INSTRUCTIONS
- We will look into prior authorization for Dupixent Pens  - Follow up in 6 months    Patient Information  DUPIXENT® (DU-pix-ent) (dupilumab) Injection,  for subcutaneous use    What is DUPIXENT? ? Stefany Hart is a prescription medicine used to treat adults with moderate-to-severe atopic dermatitis (eczema) that is not well controlled  with prescription therapies used on the skin (topical), or who cannot use topical therapies. ? Stefany Hart can be used with or without topical corticosteroids. ? It is not known if DUPIXENT is safe and effective in children. Do not use DUPIXENT if you are allergic to dupilumab or to any of the ingredients in Stefany Hart. See the end of this leaflet for a complete  list of ingredients in Stefany Hart. Before using Stefany Hart, tell your healthcare provider about all your medical conditions, including if you:  ? have eye problems  ? have a parasitic (helminth) infection  ? have asthma  ? are scheduled to receive any vaccinations. You should not receive a ?live vaccine? if you are treated with Stefany Hart. ? are pregnant or plan to become pregnant. It is not known whether Stefany Hart will harm your unborn baby. ? are breastfeeding or plan to breastfeed. It is not known whether Stefany Hart passes into your breast milk. Tell your healthcare provider about all of the medicines you take, including prescription and over-the-counter medicines, vitamins, and herbal  supplements. If you have asthma and are taking asthma medicines, do not change or stop your asthma medicine without talking to your  healthcare provider. How should I use DUPIXENT? ? See the detailed ? Instructions for Use? that comes with Stefany Hart for information on how to prepare and inject DUPIXENT and  how to properly store and throw away (dispose of) used DUPIXENT pre-filled syringes. ? Use DUPIXENT exactly as prescribed by your healthcare provider. ? Stefany Hart comes as a single-dose pre-filled syringe with needle shield.   ? Stefany Hart is those listed in a Patient Information leaflet. Do not use DUPIXENT for a  condition for which it was not prescribed. Do not give DUPIXENT to other people, even if they have the same symptoms that you have. It  may harm them. You can ask your pharmacist or healthcare provider for information about Kyleview that is written for health professionals. What are the ingredients in Kyleview? Active ingredient: dupilumab  Inactive ingredients: L-arginine hydrochloride, L-histidine, polysorbate 80, sodium acetate, sucrose, and water for injection. Manufactured by: [address] [place] [address]., [facility] [address] [place] [address]. License No. [id number]  Marketed by: Julian Esteban, [address] [place] [address]) and [address] [place] [address], [facility]  [address] is a registered trademark of [address] / © [address] [place] [address]. / Julian Tony. All  rights reserved. For more information about DUPIXENT, go to www. [email or web address].[email or web address]. [place] or call 1- 814-DUPIXENT (9-562.744.3766). This Patient Information has been approved by the U.S. Food and Drug Administration.  Issued: March 2017  [id number]

## 2021-04-07 NOTE — PROGRESS NOTES
Dermatology Patient Note  [facility]  [address]  [address]  Dept: [phone]  Dept Fax: [phone]      VISITDATE: 4/7/2021   REFERRING PROVIDER: No ref. provider found      Roel Richardson is a [de-identified] y.o. female  who presents today in the office for:    Follow-up (dupixent f/u for hand derm; not using topicals )      PERTINENT HISTORY NOT LISTED ABOVE:  Patient presents for f/u dupixent  - she is doing very well on the medication.  Hands are much improved, no redness or itching  - no SE from dupixent  - no redness of eyes    CURRENT MEDICATIONS:   Current Outpatient Medications   Medication Sig Dispense Refill    dupilumab (DUPIXENT) 300 MG/2ML SOSY injection Inject 2 mL into the skin every 14 days beginning 14 days after starter dose 4 mL 5    lisinopril (PRINIVIL;ZESTRIL) 5 MG tablet TAKE 1 TABLET EVERY DAY 90 tablet 1    atorvastatin (LIPITOR) 40 MG tablet TAKE 1 TABLET EVERY DAY 90 tablet 1    glipiZIDE (GLUCOTROL) 5 MG tablet TAKE 1 TABLET EVERY DAY WITH BREAKFAST 90 tablet 0    dilTIAZem (CARDIZEM CD) 180 MG extended release capsule TAKE 1 CAPSULE EVERY DAY 90 capsule 0    atenolol-chlorthalidone (TENORETIC) 100-25 MG per tablet TAKE 1 TABLET EVERY DAY 90 tablet 1    metFORMIN (GLUCOPHAGE-XR) 500 MG extended release tablet TAKE 1 TABLET EVERY MORNING AND TAKE 1 TABLET EVERY EVENING 180 tablet 0    ACCU-CHEK GRAYSON PLUS strip TEST ONE TIME DAILY 100 strip 2    Alcohol Swabs (B-D SINGLE USE SWABS REGULAR) PADS USE AS DIRECTED 100 each 2    diclofenac sodium (VOLTAREN) 1 % GEL Apply 2 g topically 4 times daily 2 Tube 1    Accu-Chek FastClix Lancets MISC TEST EVERY  each 3    Handicap Placard MISC by Does not apply route Expires 6/3/2023 1 each 0    Blood Glucose Monitoring Suppl (ACCU-CHEK GRAYSON PLUS) w/Device KIT TEST ONE TIME DAILY 1 kit 0    tiZANidine (ZANAFLEX) 4 MG tablet TAKE 1 TABLET THREE TIMES DAILY AS NEEDED 270 tablet 0    SITagliptin (JANUVIA) 100 MG tablet TAKE 1 TABLET BY MOUTH DAILY 90 tablet 1    blood glucose monitor strips Test 2 times a day & as needed for symptoms of irregular blood glucose. 100 strip 0    blood glucose test strips (ACCU-CHEK SMARTVIEW) strip TEST EVERY  strip 3    aspirin 81 MG tablet Take 81 mg by mouth daily      glucose monitoring kit (FREESTYLE) monitoring kit 1 kit by Does not apply route daily as needed. Whatever system is covered by her insurance is fine. 1 kit 0    Misc Natural Products (OSTEO BI-FLEX JOINT SHIELD) TABS Take by mouth daily       No current facility-administered medications for this visit. ALLERGIES:   Allergies   Allergen Reactions    Latex        SOCIAL HISTORY:  Social History     Tobacco Use    Smoking status: Never Smoker    Smokeless tobacco: Never Used   Substance Use Topics    Alcohol use: Yes     Alcohol/week: 0.0 standard drinks     Comment: occasional but rare       Pertinent ROS:  Review of Systems  Skin: Denies any new changing, growing or bleeding lesions or rashes except as described in the HPI   Constitutional: Denies fevers, chills, and malaise. PHYSICAL EXAM:   BP (!) 184/77 (Site: Left Upper Arm, Position: Sitting, Cuff Size: Medium Adult)   Pulse 70   Ht 4' 11\" (1.499 m)   Wt 157 lb (71.2 kg)   SpO2 95%   BMI 31.71 kg/m²     The patient is generally well appearing, well nourished, alert and conversational. Affect is normal.    Cutaneous Exam:  Physical Exam  Sun-exposed skin, which includes the head/face, neck, both arms, digits and/or nails was examined. Diagnoses/exam findings/medical history pertinent to this visit are listed below:    Assessment and Plan:  Assessment   1. Intrinsic atopic dermatitis of hands  - stable chronic illness  - resolved on dupixent  - patient is unable to give herself injections due to anxiety but then she might do well with the autoinjector pen.  We will work on getting that approved  - dupilumab (DUPIXENT) 300 MG/2ML SOSY injection; Inject 2 mL into the skin every 14 days beginning 14 days after starter dose  Dispense: 4 mL; Refill: 5          RTC 6 months    Patient Instructions   - We will look into prior authorization for Dupixent Pens  - Follow up in 6 months    Patient Information  DUPIXENT® (DU-pix-ent) (dupilumab) Injection,  for subcutaneous use    What is DUPIXENT? ? Vallery Pries is a prescription medicine used to treat adults with moderate-to-severe atopic dermatitis (eczema) that is not well controlled  with prescription therapies used on the skin (topical), or who cannot use topical therapies. ? Vallery Pries can be used with or without topical corticosteroids. ? It is not known if DUPIXENT is safe and effective in children. Do not use DUPIXENT if you are allergic to dupilumab or to any of the ingredients in Vallery Pries. See the end of this leaflet for a complete  list of ingredients in Vallery Pries. Before using Vallery Pries, tell your healthcare provider about all your medical conditions, including if you:  ? have eye problems  ? have a parasitic (helminth) infection  ? have asthma  ? are scheduled to receive any vaccinations. You should not receive a ?live vaccine? if you are treated with Vallery Pries. ? are pregnant or plan to become pregnant. It is not known whether Vallery Pries will harm your unborn baby. ? are breastfeeding or plan to breastfeed. It is not known whether Vallery Pries passes into your breast milk. Tell your healthcare provider about all of the medicines you take, including prescription and over-the-counter medicines, vitamins, and herbal  supplements. If you have asthma and are taking asthma medicines, do not change or stop your asthma medicine without talking to your  healthcare provider. How should I use DUPIXENT? ? See the detailed ? Instructions for Use? that comes with Vallery Pries for information on how to prepare and inject Vallery Pries and  how to properly store and throw away about side effects. You may report side effects to FDA at 7-556-HDY-1829. General information about the safe and effective use of DUPIXENT. Medicines are sometimes prescribed for purposes other than those listed in a Patient Information leaflet. Do not use DUPIXENT for a  condition for which it was not prescribed. Do not give DUPIXENT to other people, even if they have the same symptoms that you have. It  may harm them. You can ask your pharmacist or healthcare provider for information about Kyleview that is written for health professionals. What are the ingredients in Kyleview? Active ingredient: dupilumab  Inactive ingredients: L-arginine hydrochloride, L-histidine, polysorbate 80, sodium acetate, sucrose, and water for injection. Manufactured by: [address] [place] [address].[address] [place] [address]. License No. [id number]  Marketed by: Emily Esteban[address] and [address] [place] [address]  [address] is a registered trademark of [address] / © 2017 [address] [place] [address]. / [email or web address]. All  rights reserved. For more information about DUPIXENT, go to www. [employer] or call 0- 480-DUPIXROSY (4-166.457.1140). This Patient Information has been approved by the U.S. Food and Drug Administration. Issued: March 2017  [id number]        This note was created with the assistance of a speech-recognition program.  Although the intention is to generate a document that actually reflects the content of the visit, no guarantees can be provided that every mistake has been identified and corrected byediting.     Electronically signed by Jaimie Rivera MD on 4/7/21 at 1:04 PM EDT

## 2021-06-11 ENCOUNTER — HOSPITAL ENCOUNTER (OUTPATIENT)
Age: 81
Setting detail: SPECIMEN
Discharge: HOME | End: 2021-06-11
Payer: MEDICARE

## 2021-06-11 DIAGNOSIS — E11.42 TYPE 2 DIABETES MELLITUS WITH PERIPHERAL NEUROPATHY (HCC): ICD-10-CM

## 2021-06-11 DIAGNOSIS — E78.5 DYSLIPIDEMIA: ICD-10-CM

## 2021-06-11 DIAGNOSIS — I10 ESSENTIAL HYPERTENSION: ICD-10-CM

## 2021-06-11 LAB
ALBUMIN SERPL-MCNC: 4 G/DL (ref 3.5–5.2)
ALBUMIN/GLOBULIN RATIO: 1.4 (ref 1–2.5)
ALP BLD-CCNC: 97 U/L (ref 35–104)
ALT SERPL-CCNC: 17 U/L (ref 5–33)
ANION GAP SERPL CALCULATED.3IONS-SCNC: 15 MMOL/L (ref 9–17)
AST SERPL-CCNC: 20 U/L
BILIRUB SERPL-MCNC: 0.73 MG/DL (ref 0.3–1.2)
BUN BLDV-MCNC: 22 MG/DL (ref 8–23)
BUN/CREAT BLD: NORMAL (ref 9–20)
CALCIUM SERPL-MCNC: 9.4 MG/DL (ref 8.6–10.4)
CHLORIDE BLD-SCNC: 105 MMOL/L (ref 98–107)
CHOLESTEROL, FASTING: 124 MG/DL
CHOLESTEROL/HDL RATIO: 2.9
CO2: 23 MMOL/L (ref 20–31)
CREAT SERPL-MCNC: 0.62 MG/DL (ref 0.5–0.9)
ESTIMATED AVERAGE GLUCOSE: 131 MG/DL
GFR AFRICAN AMERICAN: >60 ML/MIN
GFR NON-AFRICAN AMERICAN: >60 ML/MIN
GFR SERPL CREATININE-BSD FRML MDRD: NORMAL ML/MIN/{1.73_M2}
GFR SERPL CREATININE-BSD FRML MDRD: NORMAL ML/MIN/{1.73_M2}
GLUCOSE BLD-MCNC: 84 MG/DL (ref 70–99)
HBA1C MFR BLD: 6.2 % (ref 4–6)
HCT VFR BLD CALC: 39.6 % (ref 36.3–47.1)
HDLC SERPL-MCNC: 43 MG/DL
HEMOGLOBIN: 12.8 G/DL (ref 11.9–15.1)
LDL CHOLESTEROL: 50 MG/DL (ref 0–130)
MCH RBC QN AUTO: 30.3 PG (ref 25.2–33.5)
MCHC RBC AUTO-ENTMCNC: 32.3 G/DL (ref 28.4–34.8)
MCV RBC AUTO: 93.6 FL (ref 82.6–102.9)
NRBC AUTOMATED: 0 PER 100 WBC
PDW BLD-RTO: 14 % (ref 11.8–14.4)
PLATELET # BLD: 345 K/UL (ref 138–453)
PMV BLD AUTO: 11 FL (ref 8.1–13.5)
POTASSIUM SERPL-SCNC: 4 MMOL/L (ref 3.7–5.3)
RBC # BLD: 4.23 M/UL (ref 3.95–5.11)
SODIUM BLD-SCNC: 143 MMOL/L (ref 135–144)
TOTAL PROTEIN: 6.9 G/DL (ref 6.4–8.3)
TRIGLYCERIDE, FASTING: 154 MG/DL
VLDLC SERPL CALC-MCNC: ABNORMAL MG/DL (ref 1–30)
WBC # BLD: 10.9 K/UL (ref 3.5–11.3)

## 2021-08-06 RX ORDER — DUPILUMAB 300 MG/2ML
INJECTION, SOLUTION SUBCUTANEOUS
Qty: 4 ML | Refills: 5 | Status: SHIPPED | OUTPATIENT
Start: 2021-08-06 | End: 2021-10-11 | Stop reason: SDUPTHER

## 2021-10-11 ENCOUNTER — OFFICE VISIT (OUTPATIENT)
Dept: DERMATOLOGY | Age: 81
End: 2021-10-11
Payer: MEDICARE

## 2021-10-11 VITALS
WEIGHT: 153 LBS | DIASTOLIC BLOOD PRESSURE: 74 MMHG | BODY MASS INDEX: 30.84 KG/M2 | HEIGHT: 59 IN | OXYGEN SATURATION: 97 % | TEMPERATURE: 97.3 F | HEART RATE: 70 BPM | SYSTOLIC BLOOD PRESSURE: 152 MMHG

## 2021-10-11 DIAGNOSIS — L20.84 INTRINSIC ATOPIC DERMATITIS: Primary | ICD-10-CM

## 2021-10-11 PROCEDURE — 1090F PRES/ABSN URINE INCON ASSESS: CPT | Performed by: DERMATOLOGY

## 2021-10-11 PROCEDURE — 1123F ACP DISCUSS/DSCN MKR DOCD: CPT | Performed by: DERMATOLOGY

## 2021-10-11 PROCEDURE — G8399 PT W/DXA RESULTS DOCUMENT: HCPCS | Performed by: DERMATOLOGY

## 2021-10-11 PROCEDURE — G8484 FLU IMMUNIZE NO ADMIN: HCPCS | Performed by: DERMATOLOGY

## 2021-10-11 PROCEDURE — 4040F PNEUMOC VAC/ADMIN/RCVD: CPT | Performed by: DERMATOLOGY

## 2021-10-11 PROCEDURE — 1036F TOBACCO NON-USER: CPT | Performed by: DERMATOLOGY

## 2021-10-11 PROCEDURE — 99213 OFFICE O/P EST LOW 20 MIN: CPT | Performed by: DERMATOLOGY

## 2021-10-11 PROCEDURE — G8427 DOCREV CUR MEDS BY ELIG CLIN: HCPCS | Performed by: DERMATOLOGY

## 2021-10-11 PROCEDURE — G8417 CALC BMI ABV UP PARAM F/U: HCPCS | Performed by: DERMATOLOGY

## 2021-10-11 RX ORDER — DUPILUMAB 300 MG/2ML
INJECTION, SOLUTION SUBCUTANEOUS
Qty: 4 ML | Refills: 5 | Status: SHIPPED | OUTPATIENT
Start: 2021-10-11 | End: 2022-04-11 | Stop reason: SDUPTHER

## 2021-12-03 ENCOUNTER — TELEPHONE (OUTPATIENT)
Dept: DERMATOLOGY | Age: 81
End: 2021-12-03

## 2021-12-03 NOTE — TELEPHONE ENCOUNTER
Spoke to [address] [place] [address] since we received a fax stating the patient needed to fill out her re-enrollment form for assistance, they spoke to the patient on 11/19/21 and mailed the form to the patient, they still do not have it.

## 2022-01-27 ENCOUNTER — HOSPITAL ENCOUNTER (OUTPATIENT)
Facility: CLINIC | Age: 82
Discharge: HOME | End: 2022-01-29
Payer: MEDICARE

## 2022-01-27 ENCOUNTER — HOSPITAL ENCOUNTER (OUTPATIENT)
Age: 82
Setting detail: SPECIMEN
Discharge: HOME | End: 2022-01-27

## 2022-01-27 ENCOUNTER — HOSPITAL ENCOUNTER (OUTPATIENT)
Dept: GENERAL RADIOLOGY | Facility: CLINIC | Age: 82
Discharge: HOME | End: 2022-01-29
Payer: MEDICARE

## 2022-01-27 DIAGNOSIS — R10.10 UPPER ABDOMINAL PAIN: ICD-10-CM

## 2022-01-27 DIAGNOSIS — R11.2 NON-INTRACTABLE VOMITING WITH NAUSEA, UNSPECIFIED VOMITING TYPE: ICD-10-CM

## 2022-01-27 PROCEDURE — 74019 RADEX ABDOMEN 2 VIEWS: CPT

## 2022-01-28 LAB
ALBUMIN SERPL-MCNC: 4.2 G/DL (ref 3.5–5.2)
ALBUMIN/GLOBULIN RATIO: 1.7 (ref 1–2.5)
ALP BLD-CCNC: 107 U/L (ref 35–104)
ALT SERPL-CCNC: 20 U/L (ref 5–33)
ANION GAP SERPL CALCULATED.3IONS-SCNC: 15 MMOL/L (ref 9–17)
AST SERPL-CCNC: 22 U/L
BILIRUB SERPL-MCNC: 0.95 MG/DL (ref 0.3–1.2)
BUN BLDV-MCNC: 15 MG/DL (ref 8–23)
BUN/CREAT BLD: ABNORMAL (ref 9–20)
CALCIUM SERPL-MCNC: 9.9 MG/DL (ref 8.6–10.4)
CHLORIDE BLD-SCNC: 101 MMOL/L (ref 98–107)
CO2: 26 MMOL/L (ref 20–31)
CREAT SERPL-MCNC: 0.48 MG/DL (ref 0.5–0.9)
GFR AFRICAN AMERICAN: >60 ML/MIN
GFR NON-AFRICAN AMERICAN: >60 ML/MIN
GFR SERPL CREATININE-BSD FRML MDRD: ABNORMAL ML/MIN/{1.73_M2}
GFR SERPL CREATININE-BSD FRML MDRD: ABNORMAL ML/MIN/{1.73_M2}
GLUCOSE BLD-MCNC: 109 MG/DL (ref 70–99)
HCT VFR BLD CALC: 41.9 % (ref 36.3–47.1)
HEMOGLOBIN: 13.9 G/DL (ref 11.9–15.1)
LIPASE: 27 U/L (ref 13–60)
MCH RBC QN AUTO: 30.5 PG (ref 25.2–33.5)
MCHC RBC AUTO-ENTMCNC: 33.2 G/DL (ref 28.4–34.8)
MCV RBC AUTO: 91.9 FL (ref 82.6–102.9)
NRBC AUTOMATED: 0 PER 100 WBC
PDW BLD-RTO: 13.9 % (ref 11.8–14.4)
PLATELET # BLD: 339 K/UL (ref 138–453)
PMV BLD AUTO: 11.5 FL (ref 8.1–13.5)
POTASSIUM SERPL-SCNC: 4.9 MMOL/L (ref 3.7–5.3)
RBC # BLD: 4.56 M/UL (ref 3.95–5.11)
SODIUM BLD-SCNC: 142 MMOL/L (ref 135–144)
TOTAL PROTEIN: 6.7 G/DL (ref 6.4–8.3)
WBC # BLD: 9.4 K/UL (ref 3.5–11.3)

## 2022-02-25 ENCOUNTER — TELEPHONE (OUTPATIENT)
Dept: DERMATOLOGY | Age: 82
End: 2022-02-25

## 2022-04-11 ENCOUNTER — OFFICE VISIT (OUTPATIENT)
Dept: DERMATOLOGY | Age: 82
End: 2022-04-11
Payer: MEDICARE

## 2022-04-11 VITALS
WEIGHT: 153.4 LBS | SYSTOLIC BLOOD PRESSURE: 169 MMHG | HEART RATE: 64 BPM | OXYGEN SATURATION: 95 % | TEMPERATURE: 97.9 F | DIASTOLIC BLOOD PRESSURE: 85 MMHG | BODY MASS INDEX: 30.92 KG/M2 | HEIGHT: 59 IN

## 2022-04-11 DIAGNOSIS — L30.9 HAND DERMATITIS: Primary | ICD-10-CM

## 2022-04-11 DIAGNOSIS — L20.84 INTRINSIC ATOPIC DERMATITIS: ICD-10-CM

## 2022-04-11 DIAGNOSIS — R03.0 TEMPORARY HIGH BLOOD PRESSURE: ICD-10-CM

## 2022-04-11 PROCEDURE — 1090F PRES/ABSN URINE INCON ASSESS: CPT | Performed by: DERMATOLOGY

## 2022-04-11 PROCEDURE — G8427 DOCREV CUR MEDS BY ELIG CLIN: HCPCS | Performed by: DERMATOLOGY

## 2022-04-11 PROCEDURE — 99213 OFFICE O/P EST LOW 20 MIN: CPT | Performed by: DERMATOLOGY

## 2022-04-11 PROCEDURE — G8399 PT W/DXA RESULTS DOCUMENT: HCPCS | Performed by: DERMATOLOGY

## 2022-04-11 PROCEDURE — G8417 CALC BMI ABV UP PARAM F/U: HCPCS | Performed by: DERMATOLOGY

## 2022-04-11 PROCEDURE — 1123F ACP DISCUSS/DSCN MKR DOCD: CPT | Performed by: DERMATOLOGY

## 2022-04-11 PROCEDURE — 4040F PNEUMOC VAC/ADMIN/RCVD: CPT | Performed by: DERMATOLOGY

## 2022-04-11 PROCEDURE — 1036F TOBACCO NON-USER: CPT | Performed by: DERMATOLOGY

## 2022-04-11 RX ORDER — DUPILUMAB 300 MG/2ML
INJECTION, SOLUTION SUBCUTANEOUS
Qty: 4 ML | Refills: 5 | Status: SHIPPED | OUTPATIENT
Start: 2022-04-11 | End: 2022-04-13

## 2022-04-11 NOTE — PATIENT INSTRUCTIONS
- talk to PCP about blood pressure  - continue dupixent    Patient Information  DUPIXENT® (DU-pix-ent) (dupilumab) Injection,  for subcutaneous use    What is DUPIXENT? ? Tereasa Horseman is a prescription medicine used to treat adults with moderate-to-severe atopic dermatitis (eczema) that is not well controlled  with prescription therapies used on the skin (topical), or who cannot use topical therapies. ? Tereasa Horseman can be used with or without topical corticosteroids. ? It is not known if DUPIXENT is safe and effective in children. Do not use DUPIXENT if you are allergic to dupilumab or to any of the ingredients in Tereasa Horseman. See the end of this leaflet for a complete  list of ingredients in Tereasa Horseman. Before using Tereasa Horseman, tell your healthcare provider about all your medical conditions, including if you:  ? have eye problems  ? have a parasitic (helminth) infection  ? have asthma  ? are scheduled to receive any vaccinations. You should not receive a ?live vaccine? if you are treated with Tereasa Horseman. ? are pregnant or plan to become pregnant. It is not known whether Tereasa Horseman will harm your unborn baby. ? are breastfeeding or plan to breastfeed. It is not known whether Tereasa Horseman passes into your breast milk. Tell your healthcare provider about all of the medicines you take, including prescription and over-the-counter medicines, vitamins, and herbal  supplements. If you have asthma and are taking asthma medicines, do not change or stop your asthma medicine without talking to your  healthcare provider. How should I use DUPIXENT? ? See the detailed ? Instructions for Use? that comes with Tereasa Horseman for information on how to prepare and inject DUPIXENT and  how to properly store and throw away (dispose of) used DUPIXENT pre-filled syringes. ? Use DUPIXENT exactly as prescribed by your healthcare provider. ? Tereasa Horseman comes as a single-dose pre-filled syringe with needle shield.   ? Tereasa Horseman is given as an injection under the skin (subcutaneous injection). ? If your healthcare provider decides that you or a caregiver can give the injections of Kyleview, you or your caregiver should receive  training on the right way to prepare and inject Kyleview. Do not try to inject Kyleview until you have been shown the right way by  your healthcare provider. ? If you miss a dose of DUPIXENT, give the injection within 7 days from the missed dose, then continue with the original schedule. If the  missed dose is not given within 7 days, wait until the next scheduled dose to give your DUPIXENT injection. ? If you inject more DUPIXENT than prescribed, call your healthcare provider right away. ? Your healthcare provider may prescribe other topical medicines to use with Kyleview. Use the other prescribed topical medicines  exactly as your healthcare provider tells you to. What are the possible side effects of DUPIXENT? DUPIXENT can cause serious side effects, including:  ? Allergic reactions. Stop using Kyleview and go to the nearest hospital emergency room if you get any of the following symptoms:   fever   general ill feeling   swollen lymph nodes   hives   itching   joint pain   skin rash  ? Eye problems. Tell your healthcare provider if you have any new or worsening eye problems, including eye pain or changes in vision. The most common side effects of DUPIXENT include:  ? injection site reactions  ? eye and eyelid inflammation, including redness, swelling and itching  ? cold sores in your mouth or on your lips  Tell your healthcare provider if you have any side effect that bothers you or that does not go away. These are not all of the possible side effects of DUPIXENT. Call your doctor for medical advice about side effects. You may report side effects to FDA at 7-088-FDA-1937. General information about the safe and effective use of DUPIXENT.   Medicines are sometimes prescribed for purposes other than those listed in a Patient Information leaflet. Do not use DUPIXENT for a  condition for which it was not prescribed. Do not give DUPIXENT to other people, even if they have the same symptoms that you have. It  may harm them. You can ask your pharmacist or healthcare provider for information about [place] that is written for health professionals. What are the ingredients in Kyleview? Active ingredient: dupilumab  Inactive ingredients: L-arginine hydrochloride, L-histidine, polysorbate 80, sodium acetate, sucrose, and water for injection. Manufactured by: [address] [place] [address]., [address] [place] [address]. License No. [id number]  Marketed by: Evangelina Esteban, [address] [place] [address]) and [address] [place], [place] [address]n[address]  [address] is a registered trademark of [address] / © 2017 [address] [place] [address]. / Evangelina Srivastava. All  rights reserved. For more information about DUPIXENT, go to www. [employer]. [employer] or call 7- 316-DUPIXENT (4-451.480.2816). This Patient Information has been approved by the U.S. Food and Drug Administration.  Issued: March 2017  [id number]

## 2022-04-11 NOTE — PROGRESS NOTES
Dermatology Patient Note  [address]  [address] [facility] [address]  Dept: [phone]  Dept Fax: [phone]      VISITDATE: 4/11/2022   REFERRING PROVIDER: No ref. provider found      Genie Harvey is a 81 y.o. female  who presents today in the office for:    Eczema (Nicholas Granger has been helpful. She has not outbreaks today. )      HISTORY OF PRESENT ILLNESS:  As above. Pt reports no side effects on dupixent. She states she hates needles but is able to manage.     MEDICAL PROBLEMS:  Patient Active Problem List    Diagnosis Date Noted    Type 2 diabetes mellitus with peripheral neuropathy ([facility] 01/18/2017    Obesity, Class I, BMI 30-34.9 11/19/2015    Dysthymia 03/11/2014    Diabetic peripheral neuropathy ([facility] 11/05/2013    Dyslipidemia 07/29/2013    CAD (coronary artery disease) 07/29/2013    Epiretinal membrane, right 07/16/2013    DM (diabetes mellitus) ([facility] 06/19/2013    Hypertension 06/19/2013       CURRENT MEDICATIONS:   Current Outpatient Medications   Medication Sig Dispense Refill    dupilumab (DUPIXENT) 300 MG/2ML SOSY injection Inject 2 mL into the skin every 14 days beginning 14 days after starter dose 4 mL 5    Alcohol Swabs (B-D SINGLE USE SWABS REGULAR) PADS USE AS DIRECTED 100 each 3    glipiZIDE (GLUCOTROL) 5 MG tablet TAKE 1 TABLET EVERY DAY WITH BREAKFAST 90 tablet 0    dilTIAZem (CARDIZEM CD) 180 MG extended release capsule TAKE 1 CAPSULE EVERY DAY 90 capsule 0    metFORMIN (GLUCOPHAGE-XR) 500 MG extended release tablet TAKE 1 TABLET EVERY MORNING  AND TAKE 1 TABLET EVERY EVENING 180 tablet 0    ACCU-CHEK GRAYSON PLUS strip TEST BLOOD SUGAR ONE TIME DAILY 100 strip 2    atorvastatin (LIPITOR) 40 MG tablet TAKE 1 TABLET EVERY DAY 90 tablet 1    lisinopril (PRINIVIL;ZESTRIL) 5 MG tablet TAKE 1 TABLET EVERY DAY 90 tablet 1    omeprazole (PRILOSEC) 20 MG delayed release capsule Take 1 capsule by mouth every morning (before breakfast) 30 capsule 0    SITagliptin (JANUVIA) 100 MG tablet TAKE 1 TABLET BY MOUTH DAILY 90 tablet 1    atenolol-chlorthalidone (TENORETIC) 100-25 MG per tablet TAKE 1 TABLET EVERY DAY 90 tablet 1    diclofenac sodium (VOLTAREN) 1 % GEL Apply 2 g topically 4 times daily 2 Tube 1    Accu-Chek FastClix Lancets MISC TEST EVERY  each 3    Handicap Placard MISC by Does not apply route Expires 6/3/2023 1 each 0    Blood Glucose Monitoring Suppl (ACCU-CHEK GRAYSON PLUS) w/Device KIT TEST ONE TIME DAILY 1 kit 0    tiZANidine (ZANAFLEX) 4 MG tablet TAKE 1 TABLET THREE TIMES DAILY AS NEEDED 270 tablet 0    blood glucose monitor strips Test 2 times a day & as needed for symptoms of irregular blood glucose. 100 strip 0    aspirin 81 MG tablet Take 81 mg by mouth daily      Misc Natural Products (OSTEO BI-FLEX JOINT SHIELD) TABS Take by mouth daily      glucose monitoring kit (FREESTYLE) monitoring kit 1 kit by Does not apply route daily as needed. Whatever system is covered by her insurance is fine. 1 kit 0    blood glucose test strips (ACCU-CHEK SMARTVIEW) strip TEST EVERY  strip 3     No current facility-administered medications for this visit. ALLERGIES:   Allergies   Allergen Reactions    Latex        SOCIAL HISTORY:  Social History     Tobacco Use    Smoking status: Never Smoker    Smokeless tobacco: Never Used   Substance Use Topics    Alcohol use: Yes     Alcohol/week: 0.0 standard drinks     Comment: occasional but rare       Pertinent ROS:  Review of Systems  Skin: Denies any new changing, growing or bleeding lesions or rashes except as described in the HPI   Constitutional: Denies fevers, chills, and malaise.     PHYSICAL EXAM:   BP (!) 169/85 (Site: Right Upper Arm, Position: Sitting, Cuff Size: Medium Adult)   Pulse 64   Temp 97.9 °F (36.6 °C) (Temporal)   Ht 4' 11\" (1.499 m)   Wt 153 lb 6.4 oz (69.6 kg)   SpO2 95%   BMI 30.98 kg/m² The patient is generally well appearing, well nourished, alert and conversational. Affect is normal.    Cutaneous Exam:  Physical Exam  Focused exam of hands was performed    Facial covering was not removed during examination. Diagnoses/exam findings/medical history pertinent to this visit are listed below:    Assessment:   Diagnosis Orders   1. Hand dermatitis     2. Temporary high blood pressure     3. Intrinsic atopic dermatitis  dupilumab (DUPIXENT) 300 MG/2ML SOSY injection        Plan:  Hand dermatitis  - stable chronic illness. clear on dupixent  - continue dupixent, no SE    Temporary high blood pressure  - talk to PCP about blood pressure    RTC 6 months    Future Appointments   Date Time Provider José Antonio Thomasi   10/17/2022  1:00 PM Kelly Powell MD  derm [facility]         Patient Instructions   - talk to PCP about blood pressure  - continue dupixent    Patient Information  DUPIXENT® (DU-pix-ent) (dupilumab) Injection,  for subcutaneous use    What is DUPIXENT? ? Georgeanna Alosa is a prescription medicine used to treat adults with moderate-to-severe atopic dermatitis (eczema) that is not well controlled  with prescription therapies used on the skin (topical), or who cannot use topical therapies. ? Georgeanna Alosa can be used with or without topical corticosteroids. ? It is not known if DUPIXENT is safe and effective in children. Do not use DUPIXENT if you are allergic to dupilumab or to any of the ingredients in Georgeanna Alosa. See the end of this leaflet for a complete  list of ingredients in Georgeanna Alosa. Before using Georgeanna Alosa, tell your healthcare provider about all your medical conditions, including if you:  ? have eye problems  ? have a parasitic (helminth) infection  ? have asthma  ? are scheduled to receive any vaccinations. You should not receive a ?live vaccine? if you are treated with Georgeanna Alosa. ? are pregnant or plan to become pregnant. It is not known whether Georgeanna Alosa will harm your unborn baby.   ? are breastfeeding or plan to breastfeed. It is not known whether Kyleview passes into your breast milk. Tell your healthcare provider about all of the medicines you take, including prescription and over-the-counter medicines, vitamins, and herbal  supplements. If you have asthma and are taking asthma medicines, do not change or stop your asthma medicine without talking to your  healthcare provider. How should I use DUPIXENT? ? See the detailed ? Instructions for Use? that comes with Kyleview for information on how to prepare and inject DUPIXENT and  how to properly store and throw away (dispose of) used DUPIXENT pre-filled syringes. ? Use DUPIXENT exactly as prescribed by your healthcare provider. ? Kyleview comes as a single-dose pre-filled syringe with needle shield. ? Kyleview is given as an injection under the skin (subcutaneous injection). ? If your healthcare provider decides that you or a caregiver can give the injections of Kyleview, you or your caregiver should receive  training on the right way to prepare and inject Kyleview. Do not try to inject Kyleview until you have been shown the right way by  your healthcare provider. ? If you miss a dose of DUPIXENT, give the injection within 7 days from the missed dose, then continue with the original schedule. If the  missed dose is not given within 7 days, wait until the next scheduled dose to give your DUPIXENT injection. ? If you inject more DUPIXENT than prescribed, call your healthcare provider right away. ? Your healthcare provider may prescribe other topical medicines to use with Kyleview. Use the other prescribed topical medicines  exactly as your healthcare provider tells you to. What are the possible side effects of DUPIXENT? DUPIXENT can cause serious side effects, including:  ? Allergic reactions.  Stop using Kyleview and go to the nearest hospital emergency room if you get any of the following symptoms:   fever   general ill feeling   swollen lymph nodes   hives   itching   joint pain   skin rash  ? Eye problems. Tell your healthcare provider if you have any new or worsening eye problems, including eye pain or changes in vision. The most common side effects of DUPIXENT include:  ? injection site reactions  ? eye and eyelid inflammation, including redness, swelling and itching  ? cold sores in your mouth or on your lips  Tell your healthcare provider if you have any side effect that bothers you or that does not go away. These are not all of the possible side effects of DUPIXENT. Call your doctor for medical advice about side effects. You may report side effects to FDA at 6-900-SZT-5532. General information about the safe and effective use of DUPIXENT. Medicines are sometimes prescribed for purposes other than those listed in a Patient Information leaflet. Do not use DUPIXENT for a  condition for which it was not prescribed. Do not give DUPIXENT to other people, even if they have the same symptoms that you have. It  may harm them. You can ask your pharmacist or healthcare provider for information about Kyleview that is written for health professionals. What are the ingredients in Kyleview? Active ingredient: dupilumab  Inactive ingredients: L-arginine hydrochloride, L-histidine, polysorbate 80, sodium acetate, sucrose, and water for injection. Manufactured by: [address] [place] [address].[address]. License No. [id number]  Marketed by: Bea Esteban, [address] [place] [address] and [address] [place] [address]  [address] is a registered trademark of [address] / © 2017 [address] [place] [address]. / Bea Current. All  rights reserved. For more information about DUPIXENT, go to www. [employer]. [employer] or call 0- 533KIKA Medical International CompanyDUPIXENT (3-763.823.2440). This Patient Information has been approved by the U.S. Food and Drug Administration.  Issued: March 2017  [id number]        Shey Bergman Mey Oliver, personally scribed the services dictated to me by Dr. Oscar Morrison in this documentation. I, Dr. Oscar Morrison, personally performed the services described in this documentation, as scribed by Nora Anderson in my presence, and it is both accurate and complete.     Electronically signed by Nichole Mar MD on 4/11/2022 at 1:41 PM

## 2022-04-13 RX ORDER — DUPILUMAB 300 MG/2ML
INJECTION, SOLUTION SUBCUTANEOUS
Qty: 4 ML | Refills: 5 | Status: SHIPPED | OUTPATIENT
Start: 2022-04-13 | End: 2022-08-30 | Stop reason: SDUPTHER

## 2022-07-18 ENCOUNTER — HOSPITAL ENCOUNTER (OUTPATIENT)
Age: 82
Setting detail: SPECIMEN
Discharge: HOME | End: 2022-07-18

## 2022-07-18 DIAGNOSIS — E78.5 DYSLIPIDEMIA: ICD-10-CM

## 2022-07-18 LAB
CHOLESTEROL, FASTING: 140 MG/DL
CHOLESTEROL/HDL RATIO: 3
HDLC SERPL-MCNC: 47 MG/DL
LDL CHOLESTEROL: 73 MG/DL (ref 0–130)
TRIGLYCERIDE, FASTING: 99 MG/DL

## 2022-08-30 ENCOUNTER — TELEPHONE (OUTPATIENT)
Dept: DERMATOLOGY | Age: 82
End: 2022-08-30

## 2022-08-30 DIAGNOSIS — L20.84 INTRINSIC ATOPIC DERMATITIS: ICD-10-CM

## 2022-08-30 RX ORDER — DUPILUMAB 300 MG/2ML
INJECTION, SOLUTION SUBCUTANEOUS
Qty: 12 ML | Refills: 0 | Status: SHIPPED | OUTPATIENT
Start: 2022-08-30 | End: 2022-09-22 | Stop reason: SDUPTHER

## 2022-08-30 NOTE — TELEPHONE ENCOUNTER
Pt was told by the specialty pharmacy she could get her biologic medication more frequently, like a 90 days supply all at once or something to that degree but she was told  has to ask the prescribing doctor for a new prescription to do that. Is this something we can do?  I informed the patient that's not something I was aware of and that sometimes insurances may or may not fully cover 90 day supplies of medication but im unsure if that is something she would be eligible for

## 2022-09-08 ENCOUNTER — TELEPHONE (OUTPATIENT)
Dept: DERMATOLOGY | Age: 82
End: 2022-09-08

## 2022-09-08 NOTE — TELEPHONE ENCOUNTER
Patient called to request a refill on her Dupixent prescription.   Patient was informed that Dr. Terrie Hickey approved a 90 day supply on August 30, 2022

## 2022-09-21 ENCOUNTER — TELEPHONE (OUTPATIENT)
Dept: DERMATOLOGY | Age: 82
End: 2022-09-21

## 2022-09-21 DIAGNOSIS — L20.84 INTRINSIC ATOPIC DERMATITIS: ICD-10-CM

## 2022-09-22 RX ORDER — DUPILUMAB 300 MG/2ML
INJECTION, SOLUTION SUBCUTANEOUS
Qty: 12 ML | Refills: 1 | Status: SHIPPED | OUTPATIENT
Start: 2022-09-22 | End: 2022-10-17 | Stop reason: SDUPTHER

## 2022-09-22 NOTE — TELEPHONE ENCOUNTER
Can you send refills to [facility] in patients chart please Your Child’s Health  Four-Month-Old Visit    MD Taiwo Ferrarojohn Wattss  February 14, 2018        NUTRITION:  Do not give Miesha a bottle in bed. This increases the risk of ear infections. Additionally, babies who fall asleep while drinking may wake more often in the night. Because we have less sunlight in our part of the [place], infants whose only source of nutrition is mother's milk should have nursing baby vitamins (available without prescription at the drug store) each day.  Formula fed babies should take vitamins also until they are drinking 32oz of formula each day.    EVELOPMENT:  Babies this age will soon learn to roll from front to back or back to front, make baby sounds like \"goo\" and \"ah,\" reach for objects, and later transfer objects from one hand to the other.  They explore by putting everything in their mouths.    BABYSITTERS:  As much as you love Miesha, you will occasionally need a break.  Be sure to leave the following information with your :  · Your name, home address, and telephone number.  · Where you will be, and a phone number where you can be reached.  · Name and phone number of a person to call if you cannot be reached.  · Police telephone number (or 911 if available in your area).  · Fire Department number (or 911 if available in your area).    · Poison Control number (4-550-343-0497).    ACCIDENT PREVENTION:  Scalding:  Adjust your hot-water heater temperature to 120-130 degrees F.  Falls:  Miesha may crawl in the next few months.  Use causey on stairways to prevent falls.  Small Toys and Sharp Objects:  Do not allow anyone to give Miesha small toys that could cause choking or sharp objects that could cause cuts.  Smothering:  Keep plastic bags away from her.   Poisoning:   · Remove the following items from reach or place them in a locked cabinet: Cleaning products, Kerosene (lamp oil), Paint, Pesticides, Purses (which sometimes contain medicines), Plants,  Medicines, including vitamins and birth control pills  · Use safety caps on medicines.  · Household  and polishes must be kept out of reach.   · Store medicines and  out of sight.   · Don't transfer medicines to non-child-proofed or unlabeled containers.  · Dispose of unused medications.   · Be especially careful when visiting older persons or families without children in the house. They may be in the habit of keeping their medicines out on tables.  · Syrup of Ipecac is no longer recommended to be kept in the home. Please dispose of any remaining supplies.  · Call the Poison Center at 1-540.325.1362 for any known or suspected poisoning.     CAR SAFETY:  An approved car seat in the back seat of the car is required by [place] law until Miesha is four years old and weighs at least 40 pounds. Remember to use your child's car seat even for short trips. Most accidents occur close to home. Don't forget to use your own seat belt. We have had several mothers in our practice who  in crashes while their infant in the car seat survived.    SUN EXPOSURE:  If you plan to have Miesha outside for more than 30 minutes, please follow the guidelines in our Sun Exposure handout.    SMOKING:  Children exposed to tobacco smoke have more ear infections and pneumonia.  If you smoke, please quit. If you cannot quit, smoke outside. Do not smoke near Miesha, and do not let others smoke near her.    MEDICATION FOR FEVER OR PAIN:   Acetaminophen liquid (e.g., Tylenol or Tempra) may be given every four hours as needed for pain or fever.  Acetaminophen liquid is less concentrated than the infant dropper bottle type. Be sure to check which product CONCENTRATION you are using.    · OLD Concentration INFANT Tylenol/Acetaminophen  Drops (80 MG/0.8 mL)  Child’s Weight: Dose:  06 - 11 pounds:   40 mg (1/2 dropper (4/10 mL))  12 - 17 pounds:   80 mg (1 droppers)    · NEW Concentration INFANT Tylenol/Acetaminophen  Drops (160  MG/5 mL)  Child’s Weight: Dose:  06 - 11 pounds:   40 mg (1.25 mL  (1/4 Teaspoon))  12 - 17 pounds:   80 mg (2.5 mL  (1/2 Teaspoon))    · CHILDREN’S Tylenol/Acetaminophen  (160 MG/5 mL)  Child’s Weight: Dose:  06 - 11 pounds:   40 mg (1.25 mL  (1/4 Teaspoon))  12 - 17 pounds:   80 mg (2.5 mL  (1/2 Teaspoon))    IMMUNIZATIONS:      If Miesha develops any of the following reactions within 72 hours after an immunization, notify your pediatrician by calling the pediatric phone nurse:  · A temperature of 105 degrees or above.  · More than 3 hours of continuous crying.  · A shrill, high-pitched cry.  · A seizure or fainting spell. In this case, you should call 911 or go immediately to the emergency room.    NEXT VISIT: SIX MONTHS OF AGE

## 2022-10-17 ENCOUNTER — OFFICE VISIT (OUTPATIENT)
Dept: DERMATOLOGY | Age: 82
End: 2022-10-17
Payer: MEDICARE

## 2022-10-17 VITALS
TEMPERATURE: 96.9 F | HEIGHT: 59 IN | DIASTOLIC BLOOD PRESSURE: 60 MMHG | OXYGEN SATURATION: 99 % | BODY MASS INDEX: 30.24 KG/M2 | SYSTOLIC BLOOD PRESSURE: 133 MMHG | WEIGHT: 150 LBS | HEART RATE: 102 BPM

## 2022-10-17 DIAGNOSIS — L20.84 INTRINSIC ATOPIC DERMATITIS: ICD-10-CM

## 2022-10-17 DIAGNOSIS — L30.9 HAND DERMATITIS: Primary | ICD-10-CM

## 2022-10-17 PROCEDURE — G8427 DOCREV CUR MEDS BY ELIG CLIN: HCPCS | Performed by: DERMATOLOGY

## 2022-10-17 PROCEDURE — 99213 OFFICE O/P EST LOW 20 MIN: CPT | Performed by: DERMATOLOGY

## 2022-10-17 PROCEDURE — 1036F TOBACCO NON-USER: CPT | Performed by: DERMATOLOGY

## 2022-10-17 PROCEDURE — 1090F PRES/ABSN URINE INCON ASSESS: CPT | Performed by: DERMATOLOGY

## 2022-10-17 PROCEDURE — G8484 FLU IMMUNIZE NO ADMIN: HCPCS | Performed by: DERMATOLOGY

## 2022-10-17 PROCEDURE — 1123F ACP DISCUSS/DSCN MKR DOCD: CPT | Performed by: DERMATOLOGY

## 2022-10-17 PROCEDURE — G8399 PT W/DXA RESULTS DOCUMENT: HCPCS | Performed by: DERMATOLOGY

## 2022-10-17 PROCEDURE — G8417 CALC BMI ABV UP PARAM F/U: HCPCS | Performed by: DERMATOLOGY

## 2022-10-17 RX ORDER — DUPILUMAB 300 MG/2ML
INJECTION, SOLUTION SUBCUTANEOUS
Qty: 12 ML | Refills: 1 | Status: SHIPPED | OUTPATIENT
Start: 2022-10-17

## 2022-10-17 NOTE — PROGRESS NOTES
Dermatology Patient Note  [address]  [facility]  Dept: [phone]  Dept Fax: [phone]      VISITDATE: 10/17/2022   REFERRING PROVIDER: No ref. provider found      Candance Boots is a 81 y.o. female  who presents today in the office for:    Follow-up (Hand derm- doing okay, no concerns or issues)      HISTORY OF PRESENT ILLNESS:  As above. MEDICAL PROBLEMS:  Patient Active Problem List    Diagnosis Date Noted    Type 2 diabetes mellitus with peripheral neuropathy ([facility] 01/18/2017    Obesity, Class I, BMI 30-34.9 11/19/2015    Dysthymia 03/11/2014    Diabetic peripheral neuropathy ([facility] 11/05/2013    Dyslipidemia 07/29/2013    CAD (coronary artery disease) 07/29/2013    Epiretinal membrane, right 07/16/2013    DM (diabetes mellitus) ([facility] 06/19/2013    Hypertension 06/19/2013       CURRENT MEDICATIONS:   Current Outpatient Medications   Medication Sig Dispense Refill    dupilumab (DUPIXENT) 300 MG/2ML SOSY injection Inject 2 mL into the skin every 14 days beginning 14 days after starter dose.  90 day supply 12 mL 1    lisinopril (PRINIVIL;ZESTRIL) 10 MG tablet Take 1 tablet by mouth daily 90 tablet 1    metFORMIN (GLUCOPHAGE-XR) 500 MG extended release tablet TAKE 1 TABLET EVERY MORNING  AND TAKE 1 TABLET EVERY EVENING 180 tablet 0    SITagliptin (JANUVIA) 100 MG tablet TAKE 1 TABLET BY MOUTH DAILY 90 tablet 1    glipiZIDE (GLUCOTROL) 5 MG tablet TAKE 1 TABLET EVERY DAY WITH BREAKFAST 90 tablet 1    omeprazole (PRILOSEC) 20 MG delayed release capsule Take 1 capsule by mouth every morning (before breakfast) 90 capsule 0    atenolol-chlorthalidone (TENORETIC) 100-25 MG per tablet TAKE 1 TABLET EVERY DAY 90 tablet 1    tiZANidine (ZANAFLEX) 4 MG tablet TAKE 1 TABLET THREE TIMES DAILY AS NEEDED 270 tablet 0    ACCU-CHEK GRAYSON PLUS strip TEST BLOOD SUGAR ONE TIME DAILY 100 strip 2    atorvastatin (LIPITOR) 40 MG tablet TAKE 1 TABLET EVERY DAY 90 tablet 1    diclofenac sodium (VOLTAREN) 1 % GEL Apply 2 g topically 4 times daily 2 Tube 1    Accu-Chek FastClix Lancets MISC TEST EVERY  each 3    Handicap Placard MISC by Does not apply route Expires 6/3/2023 1 each 0    Blood Glucose Monitoring Suppl (ACCU-CHEK GRAYSON PLUS) w/Device KIT TEST ONE TIME DAILY 1 kit 0    blood glucose monitor strips Test 2 times a day & as needed for symptoms of irregular blood glucose. 100 strip 0    blood glucose test strips (ACCU-CHEK SMARTVIEW) strip TEST EVERY  strip 3    aspirin 81 MG tablet Take 81 mg by mouth daily      Misc Natural Products (OSTEO BI-FLEX JOINT SHIELD) TABS Take by mouth daily      glucose monitoring kit (FREESTYLE) monitoring kit 1 kit by Does not apply route daily as needed. Whatever system is covered by her insurance is fine. 1 kit 0    diclofenac sodium (VOLTAREN) 1 % GEL Apply 4 g topically 4 times daily as needed for Pain 700 g 0    dilTIAZem (CARDIZEM CD) 240 MG extended release capsule Take 1 capsule by mouth daily 90 capsule 1     No current facility-administered medications for this visit. ALLERGIES:   Allergies   Allergen Reactions    Latex        SOCIAL HISTORY:  Social History     Tobacco Use    Smoking status: Never    Smokeless tobacco: Never   Substance Use Topics    Alcohol use: Yes     Alcohol/week: 0.0 standard drinks     Comment: occasional but rare       Pertinent ROS:  Review of Systems  Skin: Denies any new changing, growing or bleeding lesions or rashes except as described in the HPI   Constitutional: Denies fevers, chills, and malaise.     PHYSICAL EXAM:   /60   Pulse (!) 102   Temp 96.9 °F (36.1 °C)   Ht 4' 11\" (1.499 m)   Wt 150 lb (68 kg)   SpO2 99%   BMI 30.30 kg/m²     The patient is generally well appearing, well nourished, alert and conversational. Affect is normal.    Cutaneous Exam:  Physical Exam  Focused exam of hands was performed    Facial covering was not removed during examination. Diagnoses/exam findings/medical history pertinent to this visit are listed below:    Assessment:   Diagnosis Orders   1. Hand dermatitis        2. Intrinsic atopic dermatitis  dupilumab (DUPIXENT) 300 MG/2ML SOSY injection           Plan:  Hand dermatitis  - stable chronic illness, clear on dupixent  - continue dupixent, no SE     RTC 6 months     Future Appointments   Date Time Provider José Antonio Melodie   10/19/2022  9:50 AM [facility]   1/26/2023 10:50 AM [facility]   4/17/2023  1:30 PM [facility]         There are no Patient Instructions on file for this visit. Merry Thibodeaux, personally scribed the services dictated to me by Dr. Roque Maza in this documentation. I, Dr. Roque Maza, personally performed the services described in this documentation, as scribed by [facility] in my presence, and it is both accurate and complete.     Electronically signed by Gentry Gustafson MD on 10/17/2022 at 8:10 PM

## 2022-12-10 NOTE — Clinical Note
Can you please send [address] script to [facility] pharmacy
I refilled the syringes, but please simultaneously work on trying to get an approval for pens (see my note - she has issues injecting the syringes and has a nurse friend travel from [facility] Rx every 2 weeks)
No

## 2023-01-27 ENCOUNTER — OFFICE VISIT (OUTPATIENT)
Dept: PODIATRY | Age: 83
End: 2023-01-27
Payer: COMMERCIAL

## 2023-01-27 VITALS — WEIGHT: 153 LBS | HEIGHT: 59 IN | BODY MASS INDEX: 30.84 KG/M2

## 2023-01-27 DIAGNOSIS — E11.42 TYPE 2 DIABETES MELLITUS WITH PERIPHERAL NEUROPATHY (HCC): ICD-10-CM

## 2023-01-27 DIAGNOSIS — B35.1 ONYCHOMYCOSIS OF TOENAIL: Primary | ICD-10-CM

## 2023-01-27 DIAGNOSIS — M79.674 PAIN OF TOES OF BOTH FEET: ICD-10-CM

## 2023-01-27 DIAGNOSIS — M79.675 PAIN OF TOES OF BOTH FEET: ICD-10-CM

## 2023-01-27 DIAGNOSIS — E11.51 TYPE 2 DIABETES MELLITUS WITH PERIPHERAL VASCULAR DISEASE (HCC): ICD-10-CM

## 2023-01-27 PROCEDURE — 11721 DEBRIDE NAIL 6 OR MORE: CPT | Performed by: PODIATRIST

## 2023-01-27 PROCEDURE — 3044F HG A1C LEVEL LT 7.0%: CPT | Performed by: PODIATRIST

## 2023-01-27 PROCEDURE — 99203 OFFICE O/P NEW LOW 30 MIN: CPT | Performed by: PODIATRIST

## 2023-01-27 PROCEDURE — 1123F ACP DISCUSS/DSCN MKR DOCD: CPT | Performed by: PODIATRIST

## 2023-01-27 ASSESSMENT — ENCOUNTER SYMPTOMS
NAUSEA: 0
DIARRHEA: 0
COLOR CHANGE: 0
BACK PAIN: 0
SHORTNESS OF BREATH: 0

## 2023-01-27 NOTE — PROGRESS NOTES
Christy Harmon is a 81 y.o. female who presents to the office today with chief complaint of request for a diabetic foot exam.  Chief Complaint   Patient presents with    Nail Problem     B/l nail trim/ last seen Dr. Mariann Cooper 01/26/2023    Diabetes     Last blood sugar 136   Symptoms began about 15 year(s) ago. Patient denies injury to the feet. Patient states that she experiences numbness, tingling, burning, and pain to both feet. Pain is rated 5 out of 10 at it's worst and is described as intermittent. Treatments prior to today's visit include: None. Allergies   Allergen Reactions    Latex        Past Medical History:   Diagnosis Date    CAD (coronary artery disease)     DR Fabian Sandifer CARDIOLOGIST    Hyperlipidemia     ON RX    Hypertension 2008    ON RX    Type II or unspecified type diabetes mellitus without mention of complication, not stated as uncontrolled 2008     ON RX    Vision abnormalities     RT EYE CLOUDY VISION       Prior to Admission medications    Medication Sig Start Date End Date Taking? Authorizing Provider   metFORMIN (GLUCOPHAGE-XR) 500 MG extended release tablet TAKE 1 TABLET EVERY MORNING 1/26/23   Radha Alfaro MD   SITagliptin (JANUVIA) 100 MG tablet TAKE 1 TABLET BY MOUTH DAILY 1/26/23   Radha Alfaro MD   glipiZIDE (GLUCOTROL) 5 MG tablet TAKE 1 TABLET EVERY DAY WITH BREAKFAST 1/26/23   Radha Alfaro MD   busPIRone (BUSPAR) 10 MG tablet Take 1 tablet by mouth 3 times daily as needed (anxiety) 12/27/22   Radha Alfaro MD   tiZANidine (ZANAFLEX) 4 MG tablet TAKE 1 TABLET THREE TIMES DAILY AS NEEDED 12/8/22   Radha Alfaro MD   dilTIAZem (CARDIZEM CD) 240 MG extended release capsule Take 1 capsule by mouth daily 10/25/22   Radha Alfaro MD   dupilumab (DUPIXENT) 300 MG/2ML SOSY injection Inject 2 mL into the skin every 14 days beginning 14 days after starter dose.  90 day supply 10/17/22   Leena Ramirez MD   lisinopril (PRINIVIL;ZESTRIL) 10 MG tablet Take 1 tablet by mouth daily 10/11/22   REECE Davidson CNP   diclofenac sodium (VOLTAREN) 1 % GEL Apply 4 g topically 4 times daily as needed for Pain 10/11/22 11/10/22  REECE Davidson CNP   omeprazole (PRILOSEC) 20 MG delayed release capsule Take 1 capsule by mouth every morning (before breakfast) 5/12/22   Alexis Hough MD   atenolol-chlorthalidone (TENORETIC) 100-25 MG per tablet TAKE 1 TABLET EVERY DAY 5/2/22   Alexis Hough MD   ACCU-CHEK GRAYSON PLUS strip TEST BLOOD SUGAR ONE TIME DAILY 2/15/22   Alexis Hough MD   atorvastatin (LIPITOR) 40 MG tablet TAKE 1 TABLET EVERY DAY 2/1/22   Alexis Hough MD   dilTIAZem (CARDIZEM CD) 180 MG extended release capsule TAKE 1 CAPSULE EVERY DAY 3/6/21   Alexis Hough MD   Alcohol Swabs (B-D SINGLE USE SWABS REGULAR) PADS USE AS DIRECTED 10/17/20   Alexis Hough MD   diclofenac sodium (VOLTAREN) 1 % GEL Apply 2 g topically 4 times daily 10/5/20   MD Breanne TranuRajat FastClix Lancets MISC TEST EVERY DAY 10/2/20   Alexis Hough MD   Handicap Placard MISC by Does not apply route Expires 6/3/2023 6/3/20   Alexis Hough MD   Blood Glucose Monitoring Suppl (ACCU-CHEK GRAYSON PLUS) w/Device KIT TEST ONE TIME DAILY 5/7/20   Alexis Hough MD   blood glucose monitor strips Test 2 times a day & as needed for symptoms of irregular blood glucose. 11/6/19   Alexis Hough MD   blood glucose test strips (ACCU-CHEK SMARTVIEW) strip TEST EVERY DAY 10/16/19   Alexis Hough MD   aspirin 81 MG tablet Take 81 mg by mouth daily    Historical Provider, MD   Misc Natural Products (OSTEO BI-FLEX JOINT SHIELD) TABS Take by mouth daily    Historical Provider, MD   glucose monitoring kit (FREESTYLE) monitoring kit 1 kit by Does not apply route daily as needed. Whatever system is covered by her insurance is fine.  6/23/14   Alexis Hough MD       Past Surgical History:   Procedure Laterality Date    CATARACT REMOVAL Bilateral     WITH IOL    CORONARY ANGIOPLASTY  2012    2 STENTS    CYST REMOVAL  1980    ankles-GANGLION    DILATION AND CURETTAGE OF UTERUS      KNEE CARTILAGE SURGERY Bilateral     LT-2011, RT PRIOR TO 2003    VITRECTOMY  07/16/2013    Right eyey and peeling    WRIST GANGLION EXCISION Right     X 2       Family History   Problem Relation Age of Onset    Diabetes Father     Cancer Father     Heart Disease Sister     Heart Disease Brother     Heart Disease Brother        Social History     Tobacco Use    Smoking status: Never    Smokeless tobacco: Never   Substance Use Topics    Alcohol use: Yes     Alcohol/week: 0.0 standard drinks     Comment: occasional but rare       Review of Systems   Constitutional:  Negative for activity change, appetite change, chills, diaphoresis, fatigue and fever. Respiratory:  Negative for shortness of breath. Cardiovascular:  Negative for leg swelling. Gastrointestinal:  Negative for diarrhea and nausea. Endocrine: Negative for cold intolerance, heat intolerance and polyuria. Musculoskeletal:  Positive for arthralgias. Negative for back pain, gait problem, joint swelling and myalgias. Skin:  Negative for color change, pallor, rash and wound. Allergic/Immunologic: Negative for environmental allergies and food allergies. Neurological:  Negative for dizziness, weakness, light-headedness and numbness. Hematological:  Does not bruise/bleed easily. Psychiatric/Behavioral:  Negative for behavioral problems, confusion and self-injury. The patient is not nervous/anxious. Vitals: There were no vitals filed for this visit. General: AAO x 3 in NAD. Integument: There are no rashes, ulcers, or breaks in the skin noted to the bilateral lower extremities. There is no induration, subcutaneous nodules, or tightening of the skin noted to the bilateral.     Toenails 1-5 of the right foot do present with thickness, elongation, discoloration, brittleness, subungual debris.      Toenails 1-5 of the left foot do present with thickness, elongation, discoloration, brittleness, subungual debris. There is pain with palpation and debridement of toenails 1-5 of the right foot and 1-5 of the left foot. Interdigital maceration absent to web spaces 1-4, Bilateral.     There are no preulcerative lesions noted to the right foot. There are no preulcerative lesions noted to the left foot. The skin to the bilateral feet is not thin and shiny. The skin to the bilateral feet is  warm, supple, and dry. Vascular: DP pulse of the right foot is  palpable. DP pulse of the left foot is not palpable. PT pulse of the right foot is not palpable. PT pulse of the left foot is not palpable. Doppler exam produces a biphasic sound to the right DP and monophasic sounds to the left DP and the bilateral PT.    CFT is less than 3 secs to the digits of the right foot. CFT is less than 3 secs to the digits of the left foot. There is no edema noted to the bilateral foot or ankle. There is no hair growth noted to the digits of the bilateral feet. There are no varicosities noted to the right foot/ankle. There are no varicosities noted to the left foot/ankle. Erythema is absent to the bilateral feet. Neurological: Reflexes are present to the right plantar foot and to the Achilles tendon. Reflexes are present to the left plantar foot and to the Achilles tendon. Protective sensation is present to the right plantar foot as noted with a 5.07 Boston-Anne monofilament. Protective sensation is present to the left plantar foot as noted with a 5.07 Boston-Anne monofilament. Musculoskeletal:  Muscle strength is +5/5 to all four muscle groups of the right lower extremity and +5/5 to all four muscle groups of the left lower extremity. There are no areas of subluxation, dislocation, or laxity noted to either lower extremity.      Range of motion to the right ankle is  free of pain or grinding. Range of motion to the left ankle is  free of pain or grinding. Range of motion to the right subtalar joint is  free of pain or grinding. Range of motion to the left subtalar joint is  free of pain or grinding. No abnormalities, asymmetries, or misalignments are seen between the extremities. Weightbearing evaluation does not reveal rearfoot eversion, medial prominence of the talar head, loss of the medial longitudinal arch height, and too many toes sign bilaterally. The lesser digits of the right foot are contracted. The lesser digits of the left foot are contracted. There is no prominence noted to the first metatarsal head without abduction of the hallux of the right foot. There is no prominence noted to the first metatarsal head without abduction of the hallux of the left foot. Shoe examination was performed. Biomechanical Exam: normal bilaterally. Asessment: Patient is a 81 y.o. female with:    Diagnosis Orders   1. Onychomycosis of toenail  FL DEBRIDEMENT NAIL ANY METHOD 6/>    HM DIABETES FOOT EXAM      2. Pain of toes of both feet  FL DEBRIDEMENT NAIL ANY METHOD 6/>    HM DIABETES FOOT EXAM      3. Type 2 diabetes mellitus with peripheral vascular disease (HCC)  FL DEBRIDEMENT NAIL ANY METHOD 6/>    HM DIABETES FOOT EXAM      4. Type 2 diabetes mellitus with peripheral neuropathy (HCC)  Amb External Referral For Orthotics    FL DEBRIDEMENT NAIL ANY METHOD 6/>    HM DIABETES FOOT EXAM          Plan:  1. Clinical evaluation of the patient. 2. Toenails 1-5 of the right foot and 1-5 of the left foot were debrided in length and thickness using a nail nipper and a . Patient educated on proper diabetic foot care. Patient qualifies for diabetic shoes and custom molded inserts and was given a note for these today. 3. Contact office with any questions/problems/concerns. Return in about 9 weeks (around 3/31/2023) for At risk diabetic foot care. 1/27/2023      Dea Benson DPM

## 2023-02-21 ENCOUNTER — TELEPHONE (OUTPATIENT)
Dept: DERMATOLOGY | Age: 83
End: 2023-02-21

## 2023-03-17 ENCOUNTER — TELEPHONE (OUTPATIENT)
Dept: DERMATOLOGY | Age: 83
End: 2023-03-17

## 2023-03-17 NOTE — TELEPHONE ENCOUNTER
Patient wants to speak to the Dermatology Clinical Staff  regarding her Dupixent medication and the documents she submitted to the Dermatology Department. ASAP.

## 2023-03-20 NOTE — TELEPHONE ENCOUNTER
Agus smiley- spoke to pt regarding her paperwork. The boxes that needed filled in have been filled in with the patients verification.  Paper has been faxed to [employer]

## 2023-03-29 ENCOUNTER — TELEPHONE (OUTPATIENT)
Dept: DERMATOLOGY | Age: 83
End: 2023-03-29

## 2023-04-17 ENCOUNTER — OFFICE VISIT (OUTPATIENT)
Dept: DERMATOLOGY | Age: 83
End: 2023-04-17
Payer: COMMERCIAL

## 2023-04-17 VITALS
HEIGHT: 57 IN | WEIGHT: 159 LBS | SYSTOLIC BLOOD PRESSURE: 159 MMHG | DIASTOLIC BLOOD PRESSURE: 64 MMHG | OXYGEN SATURATION: 98 % | HEART RATE: 71 BPM | BODY MASS INDEX: 34.3 KG/M2

## 2023-04-17 DIAGNOSIS — L20.84 INTRINSIC ATOPIC DERMATITIS: Primary | ICD-10-CM

## 2023-04-17 DIAGNOSIS — L30.9 HAND DERMATITIS: ICD-10-CM

## 2023-04-17 PROCEDURE — 3078F DIAST BP <80 MM HG: CPT | Performed by: DERMATOLOGY

## 2023-04-17 PROCEDURE — 99213 OFFICE O/P EST LOW 20 MIN: CPT | Performed by: DERMATOLOGY

## 2023-04-17 PROCEDURE — 3077F SYST BP >= 140 MM HG: CPT | Performed by: DERMATOLOGY

## 2023-04-17 PROCEDURE — 1123F ACP DISCUSS/DSCN MKR DOCD: CPT | Performed by: DERMATOLOGY

## 2023-04-17 RX ORDER — BETAMETHASONE DIPROPIONATE 0.5 MG/G
OINTMENT TOPICAL
Qty: 50 G | Refills: 2 | Status: SHIPPED | OUTPATIENT
Start: 2023-04-17

## 2023-04-17 NOTE — PATIENT INSTRUCTIONS
New PA initiated for Dupixent. Use Diprolene ointment twice daily as needed for flares. Hand washing and moisturizin. Use lukewarm water  2. Use as little soap as necessary. Dove bar soap or cetaphil liquid cleansers are safe. 3. After washing, pat hands dry, especially between the fingers  4. Within 3 minutes of drying hands, apply Vaseline or Aquaphor ointment. It is helpful to have a supply next to every sink, next to the bed, in the car, and at work. 5. Moisturizers should be applied to the hands at least 10 times per day. 6. Do not use washcloths and do not rub or scrub the hands    When hands must be in water:  1. Wear cotton gloves under vinyl gloves  2. Do not use hot water. 3. Restrict hand exposure to water to less than 15 minutes at a time. 4. Use running water rather than submerge hands into water.

## 2023-04-17 NOTE — PROGRESS NOTES
Route to staff PA for dupixent
running water rather than submerge hands into water. Shanthi Hunt, personally scribed the services dictated to me by Dr. Nick Reich in this documentation. I, Dr. Nick Reich, personally performed the services described in this documentation, as scribed by [facility] in my presence, and it is both accurate and complete.

## 2023-04-28 ENCOUNTER — OFFICE VISIT (OUTPATIENT)
Dept: PODIATRY | Age: 83
End: 2023-04-28
Payer: COMMERCIAL

## 2023-04-28 VITALS — BODY MASS INDEX: 34.3 KG/M2 | HEIGHT: 57 IN | WEIGHT: 159 LBS

## 2023-04-28 DIAGNOSIS — E11.51 TYPE 2 DIABETES MELLITUS WITH PERIPHERAL VASCULAR DISEASE (HCC): ICD-10-CM

## 2023-04-28 DIAGNOSIS — M79.674 PAIN OF TOES OF BOTH FEET: ICD-10-CM

## 2023-04-28 DIAGNOSIS — M79.675 PAIN OF TOES OF BOTH FEET: ICD-10-CM

## 2023-04-28 DIAGNOSIS — E11.42 TYPE 2 DIABETES MELLITUS WITH PERIPHERAL NEUROPATHY (HCC): ICD-10-CM

## 2023-04-28 DIAGNOSIS — B35.1 ONYCHOMYCOSIS OF TOENAIL: Primary | ICD-10-CM

## 2023-04-28 PROCEDURE — 99999 PR OFFICE/OUTPT VISIT,PROCEDURE ONLY: CPT | Performed by: PODIATRIST

## 2023-04-28 PROCEDURE — 11721 DEBRIDE NAIL 6 OR MORE: CPT | Performed by: PODIATRIST

## 2023-05-01 ASSESSMENT — ENCOUNTER SYMPTOMS
DIARRHEA: 0
BACK PAIN: 0
SHORTNESS OF BREATH: 0
COLOR CHANGE: 0
NAUSEA: 0

## 2023-05-01 NOTE — PROGRESS NOTES
SUBJECTIVE: Karol Osei is a 81 y.o. female who returns to the office with chief complaint of painful fungal toenails. Patient relates toe nails are thickened/difficult to trim as well as painful with ambulation and with shoe gear. Chief Complaint   Patient presents with    Nail Problem     B/l nail trim, last seen Logan Pierce MD 1/26/23    Diabetes     Review of Systems   Constitutional:  Negative for activity change, appetite change, chills, diaphoresis, fatigue and fever. Respiratory:  Negative for shortness of breath. Cardiovascular:  Negative for leg swelling. Gastrointestinal:  Negative for diarrhea and nausea. Endocrine: Negative for cold intolerance, heat intolerance and polyuria. Musculoskeletal:  Positive for arthralgias. Negative for back pain, gait problem, joint swelling and myalgias. Skin:  Negative for color change, pallor, rash and wound. Allergic/Immunologic: Negative for environmental allergies and food allergies. Neurological:  Negative for dizziness, weakness, light-headedness and numbness. Hematological:  Does not bruise/bleed easily. Psychiatric/Behavioral:  Negative for behavioral problems, confusion and self-injury. The patient is not nervous/anxious. OBJECTIVE: Clinical evaluation of patient reveals nails 1,2,3,4,5 of the right foot and nails 1,2,3,4,5 of the left foot to present with thickness, elongation, discoloration, brittleness, and subungual debris. There was pain with palpation and debridement of the toenails of the bilateral feet. No open lesions noted to either foot today. The right DP pulse is palpable. The left DP pulse is not palpable. The right PT pulse is not palpable. The left PT pulse is not palpable. Protective sensation is present to the right plantar foot as noted with a 5.07 [place]gett-Anne monofilament. Protective sensation is present to the left plantar foot as noted with a 5.07 Daggett-Anne monofilament.

## 2023-05-19 ENCOUNTER — TELEPHONE (OUTPATIENT)
Dept: DERMATOLOGY | Age: 83
End: 2023-05-19

## 2023-06-09 ENCOUNTER — TELEPHONE (OUTPATIENT)
Dept: DERMATOLOGY | Age: 83
End: 2023-06-09

## 2023-06-09 NOTE — TELEPHONE ENCOUNTER
Pt called about her [facility] [employer] paperwork. [employer] did not receive a approval or denial letter from [facility] therefore they couldn't process the andreina. I sent over the paperwork needed and called pt to let her know it was submitted.

## 2023-07-25 ENCOUNTER — HOSPITAL ENCOUNTER (OUTPATIENT)
Age: 83
Setting detail: SPECIMEN
Discharge: HOME | End: 2023-07-25

## 2023-07-25 DIAGNOSIS — I10 ESSENTIAL HYPERTENSION: ICD-10-CM

## 2023-07-25 DIAGNOSIS — E11.42 TYPE 2 DIABETES MELLITUS WITH PERIPHERAL NEUROPATHY (HCC): ICD-10-CM

## 2023-07-25 DIAGNOSIS — E78.5 DYSLIPIDEMIA: ICD-10-CM

## 2023-07-25 LAB
ALBUMIN SERPL-MCNC: 3.7 G/DL (ref 3.5–5.2)
ALBUMIN/GLOB SERPL: 1.3 {RATIO} (ref 1–2.5)
ALP SERPL-CCNC: 93 U/L (ref 35–104)
ALT SERPL-CCNC: 17 U/L (ref 5–33)
ANION GAP SERPL CALCULATED.3IONS-SCNC: 10 MMOL/L (ref 9–17)
AST SERPL-CCNC: 24 U/L
BASOPHILS # BLD: 0.07 K/UL (ref 0–0.2)
BASOPHILS NFR BLD: 1 % (ref 0–2)
BILIRUB SERPL-MCNC: 0.7 MG/DL (ref 0.3–1.2)
BUN SERPL-MCNC: 15 MG/DL (ref 8–23)
CALCIUM SERPL-MCNC: 9.2 MG/DL (ref 8.6–10.4)
CHLORIDE SERPL-SCNC: 104 MMOL/L (ref 98–107)
CHOLEST SERPL-MCNC: 127 MG/DL
CHOLESTEROL/HDL RATIO: 2.7
CO2 SERPL-SCNC: 27 MMOL/L (ref 20–31)
CREAT SERPL-MCNC: 0.6 MG/DL (ref 0.5–0.9)
EOSINOPHIL # BLD: 0.55 K/UL (ref 0–0.44)
EOSINOPHILS RELATIVE PERCENT: 7 % (ref 1–4)
ERYTHROCYTE [DISTWIDTH] IN BLOOD BY AUTOMATED COUNT: 14.9 % (ref 11.8–14.4)
GFR SERPL CREATININE-BSD FRML MDRD: >60 ML/MIN/1.73M2
GLUCOSE SERPL-MCNC: 159 MG/DL (ref 70–99)
HCT VFR BLD AUTO: 39.3 % (ref 36.3–47.1)
HDLC SERPL-MCNC: 47 MG/DL
HGB BLD-MCNC: 12.8 G/DL (ref 11.9–15.1)
IMM GRANULOCYTES # BLD AUTO: 0.03 K/UL (ref 0–0.3)
IMM GRANULOCYTES NFR BLD: 0 %
LDLC SERPL CALC-MCNC: 63 MG/DL (ref 0–130)
LYMPHOCYTES NFR BLD: 2.29 K/UL (ref 1.1–3.7)
LYMPHOCYTES RELATIVE PERCENT: 30 % (ref 24–43)
MCH RBC QN AUTO: 30.8 PG (ref 25.2–33.5)
MCHC RBC AUTO-ENTMCNC: 32.6 G/DL (ref 28.4–34.8)
MCV RBC AUTO: 94.7 FL (ref 82.6–102.9)
MONOCYTES NFR BLD: 0.52 K/UL (ref 0.1–1.2)
MONOCYTES NFR BLD: 7 % (ref 3–12)
NEUTROPHILS NFR BLD: 55 % (ref 36–65)
NEUTS SEG NFR BLD: 4.27 K/UL (ref 1.5–8.1)
NRBC BLD-RTO: 0 PER 100 WBC
PLATELET # BLD AUTO: 242 K/UL (ref 138–453)
PMV BLD AUTO: 11.7 FL (ref 8.1–13.5)
POTASSIUM SERPL-SCNC: 4.6 MMOL/L (ref 3.7–5.3)
PROT SERPL-MCNC: 6.6 G/DL (ref 6.4–8.3)
RBC # BLD AUTO: 4.15 M/UL (ref 3.95–5.11)
RBC # BLD: ABNORMAL 10*6/UL
SODIUM SERPL-SCNC: 141 MMOL/L (ref 135–144)
TRIGL SERPL-MCNC: 87 MG/DL
WBC OTHER # BLD: 7.7 K/UL (ref 3.5–11.3)

## 2023-07-26 LAB
CREAT UR-MCNC: 63 MG/DL (ref 28–217)
MICROALBUMIN UR-MCNC: 19 MG/L
MICROALBUMIN/CREAT UR-RTO: 30 MCG/MG CREAT

## 2023-08-31 ENCOUNTER — OFFICE VISIT (OUTPATIENT)
Dept: DERMATOLOGY | Age: 83
End: 2023-08-31
Payer: MEDICARE

## 2023-08-31 VITALS
SYSTOLIC BLOOD PRESSURE: 181 MMHG | TEMPERATURE: 97.2 F | WEIGHT: 159.2 LBS | OXYGEN SATURATION: 95 % | DIASTOLIC BLOOD PRESSURE: 65 MMHG | BODY MASS INDEX: 34.45 KG/M2 | HEART RATE: 56 BPM

## 2023-08-31 DIAGNOSIS — L20.84 INTRINSIC ATOPIC DERMATITIS: ICD-10-CM

## 2023-08-31 DIAGNOSIS — L30.9 HAND DERMATITIS: Primary | ICD-10-CM

## 2023-08-31 PROCEDURE — 99213 OFFICE O/P EST LOW 20 MIN: CPT | Performed by: DERMATOLOGY

## 2023-08-31 PROCEDURE — 3074F SYST BP LT 130 MM HG: CPT | Performed by: DERMATOLOGY

## 2023-08-31 PROCEDURE — 3078F DIAST BP <80 MM HG: CPT | Performed by: DERMATOLOGY

## 2023-08-31 PROCEDURE — 1123F ACP DISCUSS/DSCN MKR DOCD: CPT | Performed by: DERMATOLOGY

## 2023-08-31 RX ORDER — DUPILUMAB 300 MG/2ML
INJECTION, SOLUTION SUBCUTANEOUS
Qty: 12 ML | Refills: 1 | Status: ACTIVE | OUTPATIENT
Start: 2023-08-31

## 2023-08-31 NOTE — PROGRESS NOTES
Dermatology Patient Note  [address]  [address] [place] [address] [address] [address]  Dept: [phone]  Dept Fax: [phone]      VISITDATE: 8/31/2023   REFERRING PROVIDER: No ref. provider found      Samy Diaz is a 81 y.o. female  who presents today in the office for:    Follow up for Headroom treatment for eczema and hand dermatitis     HISTORY OF PRESENT ILLNESS:  [place] [address] [place] for eczema and hand dermatitis. She is accompanied today by her son. Patient states she has been tolerating her Dupixent well with no side effects. She admits to tearing from her eyes but this has always been a problem since [facility] [employer]. Patient denies painful joints. MEDICAL PROBLEMS:  Patient Active Problem List    Diagnosis Date Noted    Type 2 diabetes mellitus with peripheral neuropathy ([facility]) 01/18/2017    Obesity, Class I, BMI 30-34.9 11/19/2015    Dysthymia 03/11/2014    Diabetic peripheral neuropathy ([facility]) 11/05/2013    Dyslipidemia 07/29/2013    CAD (coronary artery disease) 07/29/2013    Epiretinal membrane, right 07/16/2013    DM (diabetes mellitus) ([address] ) 06/19/2013    Hypertension 06/19/2013       CURRENT MEDICATIONS:   Current Outpatient Medications   Medication Sig Dispense Refill    dupilumab (DUPIXENT) 300 MG/2ML SOSY injection Inject 2 mL into the skin every 14 days beginning 14 days after starter dose.  90 day supply 12 mL 1    omeprazole (PRILOSEC) 20 MG delayed release capsule Take 1 capsule by mouth every morning (before breakfast) 90 capsule 0    tiZANidine (ZANAFLEX) 4 MG tablet TAKE 1 TABLET THREE TIMES DAILY AS NEEDED 270 tablet 0    busPIRone (BUSPAR) 10 MG tablet Take 1 tablet by mouth 3 times daily as needed (anxiety) 90 tablet 1    glipiZIDE (GLUCOTROL) 5 MG tablet TAKE 1 TABLET EVERY DAY WITH BREAKFAST 90 tablet 1    atenolol-chlorthalidone (TENORETIC) 100-25 MG per tablet Take

## 2023-08-31 NOTE — PATIENT INSTRUCTIONS
Discontinue Diprolene and use preferred notion as needed      Hand washing and moisturizin. Use lukewarm water  2. Use as little soap as necessary. Dove bar soap or cetaphil liquid cleansers are safe. 3. After washing, pat hands dry, especially between the fingers  4. Within 3 minutes of drying hands, apply Vaseline or Aquaphor ointment. It is helpful to have a supply next to every sink, next to the bed, in the car, and at work. 5. Moisturizers should be applied to the hands at least 10 times per day. 6. Do not use washcloths and do not rub or scrub the hands     When hands must be in water:  1. Wear cotton gloves under vinyl gloves  2. Do not use hot water. 3. Restrict hand exposure to water to less than 15 minutes at a time. 4. Use running water rather than submerge hands into water.

## 2023-10-11 ENCOUNTER — TELEPHONE (OUTPATIENT)
Dept: DERMATOLOGY | Age: 83
End: 2023-10-11

## 2023-10-11 NOTE — TELEPHONE ENCOUNTER
Patient called was last seen in August and she said she mentioned to you that she thought she might be having some rotator cuff pain but that it usually subsides. She said she feels the pain Is really more under the right armpit in that joint. Its been going on for a while now. It really hurts to put her purse on her shoulder and when she adjusts her pillow. She is concerned this could be a side effect of the Dupixent possibly?

## 2023-10-11 NOTE — TELEPHONE ENCOUNTER
Typically, arthritis caused by dupixent is more generalized (multiple joints), although single joint arthritis has been described. It would be reasonable to skip a few doses of dupixent to see if joint pain improves.  She can use topicals to prevent recurrence of the dermatitis of hands

## 2023-10-11 NOTE — TELEPHONE ENCOUNTER
Pt informed and understands- informed her to skip two doses of the dupixent then give us an updated after skipping those two doses to see how she is doing and only using topicals for the dermatitis of the hands.

## 2023-10-18 ENCOUNTER — TELEPHONE (OUTPATIENT)
Dept: PHARMACY | Facility: CLINIC | Age: 83
End: 2023-10-18

## 2023-10-18 NOTE — TELEPHONE ENCOUNTER
[facility] CLINICAL PHARMACY: ADHERENCE REVIEW  Identified care gap per [employer]: fills at Non-preferred pharmacy: [place]: Diabetes adherence    Patient also appears to be prescribed: ACE/ARB and Statin    ASSESSMENT    ACE/ARB ADHERENCE    Insurance Records claims through 10/7/23: no claims    Appears lisinopril 10mg daily reordered with 23 PCP appt, as \"NO PRINT\"    BP Readings from Last 3 Encounters:   23 118/62   23 (!) 181/65   23 132/60     Estimated Creatinine Clearance: 63 mL/min (based on SCr of 0.6 mg/dL). Lab Results   Component Value Date    CREATININE 0.6 2023     Lab Results   Component Value Date    K 4.6 2023     DIABETES ADHERENCE    Insurance Records claims through 10/7/23 (Prior Year [address] = not reported; YTD [address] = FIRST FILL; Potential Fail Date: 10/23/23):   GLIPIZIDE    TAB 5MG last filled on 6/15/23 for 90 day supply. Next refill due: 23    Prescribed si tablet/capsule daily    Per chart, was glipizide dc'd 23 by PCP as \"side effects\" - to continue Januvia 100mg daily and metformin ER 500mg daily  - Appears getting Januvia through  and/or samples  - Appears 23 metformin rx was \"NO PRINT\"; per med list, last metformin rx e-rx'd was 10/19/22 for #180? Lab Results   Component Value Date    LABA1C 6.7 2023    LABA1C 6.8 2023    LABA1C 6.4 2022     STATIN ADHERENCE    Insurance Records claims through 10/7/23: no claims reported    Per Insurer Portal: atorvastatin 40mg daily last filled on 10/11/23 for 90 day supply at [address]      Lab Results   Component Value Date    CHOL 127 2023    TRIG 87 2023    HDL 47 2023    LDLCHOLESTEROL 63 2023     ALT   Date Value Ref Range Status   2023 17 5 - 33 U/L Final     AST   Date Value Ref Range Status   2023 24 <32 U/L Final     The ASCVD Risk score (Arabella DK, et al., 2019) failed to calculate for the following reasons:

## 2023-11-02 ENCOUNTER — TELEPHONE (OUTPATIENT)
Dept: DERMATOLOGY | Age: 83
End: 2023-11-02

## 2023-11-02 NOTE — TELEPHONE ENCOUNTER
I think that rules out the shoulder pain being caused by dupixent. Please see your primary care physician to discuss shoulder pain and next steps with diagnosis and treatment.    Yes, go ahead and restart the dupixent.    Rachael Reid MD

## 2023-11-02 NOTE — TELEPHONE ENCOUNTER
Pt called stating that after she skipped two doses of her dupixent she is still experiencing the sharp rotator cuff pain. States that it will come and go. States it did slightly improve but still present. Would like to know if we could possibly order an x-ray or what you think would be the next best steps.     As well pt is due for her dupixent next week, she wants to know if are you okay with her starting it up again even though she still experiences the shoulder pain?

## 2024-01-04 ENCOUNTER — TELEPHONE (OUTPATIENT)
Dept: DERMATOLOGY | Age: 84
End: 2024-01-04

## 2024-01-04 NOTE — TELEPHONE ENCOUNTER
Received a letter stated patient was denied for the dupixent my way program, letter stated because she qualifies for low income subsidy. Called dupixent my way and they state the patient is at 129% of the federal government poverty guideline and the program states she has to be at 135% or over to qualify for this program. They suggested she bring in actual proof of income and/or a letter stating she was denied for medicaid program. Spoke with the patient and she was agreeable to bring this info in when her son is back in town.

## 2024-01-11 DIAGNOSIS — L20.84 INTRINSIC ATOPIC DERMATITIS: ICD-10-CM

## 2024-01-11 RX ORDER — DUPILUMAB 300 MG/2ML
INJECTION, SOLUTION SUBCUTANEOUS
Qty: 12 ML | Refills: 0 | Status: SHIPPED | OUTPATIENT
Start: 2024-01-11

## 2024-01-11 NOTE — TELEPHONE ENCOUNTER
Patient requesting refill for dupixent refill.    Patient assistance program has been approved.     LV-8/31/23

## 2024-02-22 NOTE — PROGRESS NOTES
Dermatology Patient Note  [facility], [facility] DERMATOLOGY  [address]  [address]  [facility] [address]  Dept: [phone]  Dept Fax: [phone]      VISITDATE: 2/29/2024   REFERRING PROVIDER: No ref. provider found      Lindy Mclaughlin is a 83 y.o. female  who presents today in the office for:    Other (6 mo F/U Hand dermatitis/Dupixent patient says much improved but does get itchy hands sometimes. )      HISTORY OF PRESENT ILLNESS:  6 month follow up for hand dermatitis. Patient is currently on Dupixent. Patient reports that the itching is much improved, but she will still experience occasional itchiness on the hands. Admits to some swelling at the injection site. Denies any other SE.       MEDICAL PROBLEMS:  Patient Active Problem List    Diagnosis Date Noted    Type 2 diabetes mellitus with peripheral neuropathy (HCC) 01/18/2017    Obesity, Class I, BMI 30-34.9 11/19/2015    Dysthymia 03/11/2014    Diabetic peripheral neuropathy (HCC) 11/05/2013    Dyslipidemia 07/29/2013    CAD (coronary artery disease) 07/29/2013    Epiretinal membrane, right 07/16/2013    DM (diabetes mellitus) ([facility]) 06/19/2013    Hypertension 06/19/2013       CURRENT MEDICATIONS:   Current Outpatient Medications   Medication Sig Dispense Refill    Omega-3 Fatty Acids (OMEGA 3 500 PO) 1 capsule      Calcium-Vitamin D 600-5 MG-MCG TABS Take 1 tablet by mouth daily      dilTIAZem (CARDIZEM CD) 240 MG extended release capsule Take 1 capsule by mouth daily 90 capsule 1    tiZANidine (ZANAFLEX) 4 MG tablet TAKE 1 TABLET THREE TIMES DAILY AS NEEDED 270 tablet 0    busPIRone (BUSPAR) 10 MG tablet Take 1 tablet by mouth 3 times daily as needed (anxiety) 270 tablet 0    metFORMIN (GLUCOPHAGE-XR) 500 MG extended release tablet Take 1 tablet by mouth 2 times daily (with meals) TAKE 1 TABLET EVERY MORNING 180 tablet 0    SITagliptin (JANUVIA) 100 MG tablet TAKE 1 TABLET BY MOUTH

## 2024-02-29 ENCOUNTER — OFFICE VISIT (OUTPATIENT)
Dept: DERMATOLOGY | Age: 84
End: 2024-02-29
Payer: MEDICARE

## 2024-02-29 VITALS
SYSTOLIC BLOOD PRESSURE: 141 MMHG | OXYGEN SATURATION: 96 % | DIASTOLIC BLOOD PRESSURE: 57 MMHG | TEMPERATURE: 97.8 F | BODY MASS INDEX: 32.88 KG/M2 | HEART RATE: 65 BPM | WEIGHT: 152 LBS

## 2024-02-29 DIAGNOSIS — L20.84 INTRINSIC ATOPIC DERMATITIS: ICD-10-CM

## 2024-02-29 DIAGNOSIS — L30.9 HAND DERMATITIS: Primary | ICD-10-CM

## 2024-02-29 PROCEDURE — 1123F ACP DISCUSS/DSCN MKR DOCD: CPT | Performed by: DERMATOLOGY

## 2024-02-29 PROCEDURE — 3077F SYST BP >= 140 MM HG: CPT | Performed by: DERMATOLOGY

## 2024-02-29 PROCEDURE — 99213 OFFICE O/P EST LOW 20 MIN: CPT | Performed by: DERMATOLOGY

## 2024-02-29 PROCEDURE — 3078F DIAST BP <80 MM HG: CPT | Performed by: DERMATOLOGY

## 2024-02-29 RX ORDER — ASPIRIN 81 MG/1
TABLET ORAL
COMMUNITY
Start: 2014-05-16

## 2024-02-29 NOTE — PATIENT INSTRUCTIONS
Hand washing and moisturizin. Use lukewarm water  2. Use as little soap as necessary. Dove bar soap or cetaphil liquid cleansers are safe.  3. After washing, pat hands dry, especially between the fingers  4. Within 3 minutes of drying hands, apply Vaseline or Aquaphor ointment. It is helpful to have a supply next to every sink, next to the bed, in the car, and at work.  5. Moisturizers should be applied to the hands at least 10 times per day.  6. Do not use washcloths and do not rub or scrub the hands    When hands must be in water:  1. Wear cotton gloves under vinyl gloves  2. Do not use hot water.  3. Restrict hand exposure to water to less than 15 minutes at a time.  4. Use running water rather than submerge hands into water.

## 2024-03-03 RX ORDER — DUPILUMAB 300 MG/2ML
INJECTION, SOLUTION SUBCUTANEOUS
Qty: 12 ML | Refills: 1 | Status: SHIPPED | OUTPATIENT
Start: 2024-03-03

## 2024-08-29 ENCOUNTER — OFFICE VISIT (OUTPATIENT)
Dept: DERMATOLOGY | Age: 84
End: 2024-08-29
Payer: MEDICARE

## 2024-08-29 VITALS
TEMPERATURE: 97.3 F | HEIGHT: 57 IN | OXYGEN SATURATION: 98 % | HEART RATE: 61 BPM | WEIGHT: 143 LBS | SYSTOLIC BLOOD PRESSURE: 158 MMHG | BODY MASS INDEX: 30.85 KG/M2 | DIASTOLIC BLOOD PRESSURE: 65 MMHG

## 2024-08-29 DIAGNOSIS — L30.9 HAND DERMATITIS: Primary | ICD-10-CM

## 2024-08-29 DIAGNOSIS — L20.84 INTRINSIC ATOPIC DERMATITIS: ICD-10-CM

## 2024-08-29 PROCEDURE — 99213 OFFICE O/P EST LOW 20 MIN: CPT | Performed by: DERMATOLOGY

## 2024-08-29 PROCEDURE — 3077F SYST BP >= 140 MM HG: CPT | Performed by: DERMATOLOGY

## 2024-08-29 PROCEDURE — 3078F DIAST BP <80 MM HG: CPT | Performed by: DERMATOLOGY

## 2024-08-29 PROCEDURE — 1123F ACP DISCUSS/DSCN MKR DOCD: CPT | Performed by: DERMATOLOGY

## 2024-08-29 NOTE — PROGRESS NOTES
Dermatology Patient Note  [facility] DERMATOLOGY  [address]  [address]  [facility] [address]  Dept: [phone]  Dept Fax: [phone]      VISITDATE: 8/29/2024   REFERRING PROVIDER: No ref. provider found      Lindy Mclaughlin is a 84 y.o. female  who presents today in the office for:    Other (Patient presents today for a six month follow up of hand dermatitis. She she has been doing well on the Dupixent. )      HISTORY OF PRESENT ILLNESS:  As above. Patient presents for a 6 month follow up for hand dermatitis. At the last visit, 2/29/2024, she was stable and continued on Dupixent. Also advised to keep hands moisturized.     Today, patient states she is doing well. She continues to use Eucerin cream and Dupixent. No SE noted. She has arthralgia of frandy knees, not worsened with dupixent.     MEDICAL PROBLEMS:  Patient Active Problem List    Diagnosis Date Noted    Type 2 diabetes mellitus with peripheral neuropathy (HCC) 01/18/2017    Obesity, Class I, BMI 30-34.9 11/19/2015    Dysthymia 03/11/2014    Diabetic peripheral neuropathy (HCC) 11/05/2013    Dyslipidemia 07/29/2013    CAD (coronary artery disease) 07/29/2013    Epiretinal membrane, right 07/16/2013    DM (diabetes mellitus) ([facility]) 06/19/2013    Hypertension 06/19/2013       CURRENT MEDICATIONS:   Current Outpatient Medications   Medication Sig Dispense Refill    dilTIAZem (CARDIZEM CD) 240 MG extended release capsule Take 1 capsule by mouth daily 90 capsule 1    amLODIPine (NORVASC) 5 MG tablet Take 1 tablet by mouth daily 90 tablet 1    lisinopril (PRINIVIL;ZESTRIL) 10 MG tablet Take 1 tablet by mouth daily 90 tablet 1    metFORMIN (GLUCOPHAGE-XR) 500 MG extended release tablet Take 1 tablet by mouth 2 times daily (with meals) TAKE 1 TABLET EVERY MORNING 180 tablet 0    atorvastatin (LIPITOR) 40 MG tablet Take 1 tablet by mouth daily 90 tablet 1    atenolol-chlorthalidone

## 2024-10-23 NOTE — PROGRESS NOTES
Dermatology Patient Note  [address]  [address]  [address] [place] [address]  Dept: [phone]  Dept Fax: [phone]      VISITDATE: 10/11/2021   REFERRING PROVIDER: No ref. provider found      Leobardo Kunz is a [de-identified] y.o. female  who presents today in the office for:    Follow-up ([address] f/u- doing \"amazing\" no side effects)      HISTORY OF PRESENT ILLNESS:  As above. No SE on Dupixent. No eye redness or injection site pain.     MEDICAL PROBLEMS:  Patient Active Problem List    Diagnosis Date Noted    Type 2 diabetes mellitus with peripheral neuropathy ([facility] 01/18/2017    Obesity, Class I, BMI 30-34.9 11/19/2015    Dysthymia 03/11/2014    [facility] 11/05/2013    Dyslipidemia 07/29/2013    CAD (coronary artery disease) 07/29/2013    Epiretinal membrane, right 07/16/2013    DM (diabetes mellitus) ([facility] 06/19/2013    Hypertension 06/19/2013       CURRENT MEDICATIONS:   Current Outpatient Medications   Medication Sig Dispense Refill    dilTIAZem (CARDIZEM CD) 180 MG extended release capsule TAKE 1 CAPSULE EVERY DAY 90 capsule 0    metFORMIN (GLUCOPHAGE-XR) 500 MG extended release tablet TAKE 1 TABLET EVERY MORNING  AND TAKE 1 TABLET EVERY EVENING 180 tablet 0    atorvastatin (LIPITOR) 40 MG tablet TAKE 1 TABLET EVERY DAY 90 tablet 1    lisinopril (PRINIVIL;ZESTRIL) 5 MG tablet TAKE 1 TABLET EVERY DAY 90 tablet 1    glipiZIDE (GLUCOTROL) 5 MG tablet TAKE 1 TABLET EVERY DAY WITH BREAKFAST 90 tablet 0    dupilumab (DUPIXENT) 300 MG/2ML SOSY injection Inject 2 mL into the skin every 14 days beginning 14 days after starter dose 4 mL 5    Alcohol Swabs (B-D SINGLE USE SWABS REGULAR) PADS USE AS DIRECTED 100 each 5    atenolol-chlorthalidone (TENORETIC) 100-25 MG per tablet TAKE 1 TABLET EVERY DAY 90 tablet 1    ACCU-CHEK GRAYSON PLUS strip TEST ONE TIME DAILY 100 strip 2    diclofenac sodium (VOLTAREN) 1 % GEL Apply 2 g topically 4 times daily 2 Tube 1    Accu-Chek FastClix Lancets MISC TEST EVERY  each 3    Handicap Placard MISC by Does not apply route Expires 6/3/2023 1 each 0    tiZANidine (ZANAFLEX) 4 MG tablet TAKE 1 TABLET THREE TIMES DAILY AS NEEDED 270 tablet 0    SITagliptin (JANUVIA) 100 MG tablet TAKE 1 TABLET BY MOUTH DAILY 90 tablet 1    blood glucose monitor strips Test 2 times a day & as needed for symptoms of irregular blood glucose. 100 strip 0    blood glucose test strips (ACCU-CHEK SMARTVIEW) strip TEST EVERY  strip 3    aspirin 81 MG tablet Take 81 mg by mouth daily      Misc Natural Products (OSTEO BI-FLEX JOINT SHIELD) TABS Take by mouth daily      glucose monitoring kit (FREESTYLE) monitoring kit 1 kit by Does not apply route daily as needed. Whatever system is covered by her insurance is fine. 1 kit 0    diclofenac sodium (VOLTAREN) 1 % GEL APPLY 2 GRAMS TO AFFECTED AREA(S) FOUR TIMES DAILY (Patient not taking: Reported on 6/14/2021) 400 g 1    Blood Glucose Monitoring Suppl (ACCU-CHEK GRAYSON PLUS) w/Device KIT TEST ONE TIME DAILY 1 kit 0     No current facility-administered medications for this visit. ALLERGIES:   Allergies   Allergen Reactions    Latex        SOCIAL HISTORY:  Social History     Tobacco Use    Smoking status: Never Smoker    Smokeless tobacco: Never Used   Substance Use Topics    Alcohol use: Yes     Alcohol/week: 0.0 standard drinks     Comment: occasional but rare       Pertinent ROS:  Review of Systems  Skin: Denies any new changing, growing or bleeding lesions or rashes except as described in the HPI   Constitutional: Denies fevers, chills, and malaise.     PHYSICAL EXAM:   BP (!) 152/74   Pulse 70   Temp 97.3 °F (36.3 °C)   Ht 4' 11\" (1.499 m)   Wt 153 lb (69.4 kg)   SpO2 97%   BMI 30.90 kg/m²     The patient is generally well appearing, well nourished, alert and conversational. Affect is normal.    Cutaneous Exam:  Physical Exam  Sun-exposed skin: head/face, neck, both arms, digits and nails were examined. Facial covering was not removed during examination. Diagnoses/exam findings/medical history pertinent to this visit are listed below:    Assessment:   Diagnosis Orders   1. Intrinsic atopic dermatitis          Plan:  Intrinsic atopic dermatitis of hands  - stable chronic illness  - clear on dupixent, no SE  - continue dupixent      RTC 6 months     Future Appointments   Date Time Provider José Antonio Sewell   10/11/2021  1:15 PM Arlet Sims MD  derm [id number]   10/14/2021 11:00 AM Merrick Christianson MD [facility] [address] [facility]         Patient Instructions   Continue dupixent. Follow up in 6 months       This note was created with the assistance of a speech-recognition program.  Although the intention is to generate a document that actually reflects the content of the visit, no guarantees can be provided that every mistake has been identified and corrected by editing. I, Dr. Logan Moreno, personally performed the services described in this documentation, as scribed by [facility] in my presence, and it is both accurate and complete.      Electronically signed by Arlet Sims MD on 10/11/21 at 1:03 PM EDT Recommended changing reglan from standing to PRN

## 2024-11-26 ENCOUNTER — TELEPHONE (OUTPATIENT)
Dept: DERMATOLOGY | Age: 84
End: 2024-11-26

## 2024-11-26 DIAGNOSIS — L20.84 INTRINSIC ATOPIC DERMATITIS: ICD-10-CM

## 2024-11-27 RX ORDER — DUPILUMAB 300 MG/2ML
INJECTION, SOLUTION SUBCUTANEOUS
Qty: 12 ML | Refills: 1 | Status: ACTIVE | OUTPATIENT
Start: 2024-11-27

## 2025-01-13 ENCOUNTER — TELEPHONE (OUTPATIENT)
Age: 85
End: 2025-01-13

## 2025-01-13 NOTE — TELEPHONE ENCOUNTER
Patient phones to say [employer] has not received her paperwork that she sent to us.  There is paperwork scanned in her chart, but it is for [employer]. I advised the patient that I do not believe it is the correct paperwork. I filled out new paperwork and additional paperwork from [employer] that is required. She would like this paperwork mailed to her to complete.  Provider portion faxed to [employer] and patient portion mailed to patient.

## 2025-02-21 NOTE — PROGRESS NOTES
Dermatology Patient Note  [facility] PHYSICIANS [facility]  [facility] DERMATOLOGY  [address] [place] [address]  Dept: [phone]  Dept Fax: [phone]      VISITDATE: 3/3/2025   REFERRING PROVIDER: No ref. provider found      Lindy Mclaughlin is a 84 y.o. female  who presents today in the office for:    follow up (Pt presents today for her 6 month follow up for hand dermatitis. She is currently using Dupixent and states that everything is going well. She has no concerns at this present time. )    HISTORY OF PRESENT ILLNESS:  As above. Patient presents for a 6 month follow up for hand dermatitis. At the last visit, 8/29/24, she was stable and continued on Dupixent. Also advised to keep hands moisturized.     Patient states hand dermatitis is stable on Dupixent. She did have an issue getting refills once with some itching. No SE including new/worsening joint pain or redness in her eyes. She does notice tearing up in sunlight, follows with ophthalmology. She has a friend who is a nurse that comes over to help with injections.     MEDICAL PROBLEMS:  Patient Active Problem List    Diagnosis Date Noted    Type 2 diabetes mellitus with peripheral neuropathy ([facility]) 01/18/2017    Obesity, Class I, BMI 30-34.9 11/19/2015    Dysthymia 03/11/2014    Diabetic peripheral neuropathy ([facility]) 11/05/2013    Dyslipidemia 07/29/2013    CAD (coronary artery disease) 07/29/2013    Epiretinal membrane, right 07/16/2013    DM (diabetes mellitus) ([facility]) 06/19/2013    Hypertension 06/19/2013       CURRENT MEDICATIONS:   Current Outpatient Medications   Medication Sig Dispense Refill    dilTIAZem (CARDIZEM CD) 240 MG extended release capsule Take 1 capsule by mouth daily 90 capsule 1    dupilumab (DUPIXENT) 300 MG/2ML SOSY injection Inject 2 mL into the skin every 14 days beginning 14 days after starter dose. 90 day supply 12 mL 1    atorvastatin (LIPITOR) 40 MG tablet TAKE 1 TABLET DAILY 90 tablet 1

## 2025-03-03 ENCOUNTER — OFFICE VISIT (OUTPATIENT)
Age: 85
End: 2025-03-03
Payer: MEDICARE

## 2025-03-03 VITALS
HEART RATE: 58 BPM | TEMPERATURE: 98.1 F | BODY MASS INDEX: 28.35 KG/M2 | OXYGEN SATURATION: 99 % | WEIGHT: 140.6 LBS | SYSTOLIC BLOOD PRESSURE: 145 MMHG | HEIGHT: 59 IN | DIASTOLIC BLOOD PRESSURE: 82 MMHG

## 2025-03-03 DIAGNOSIS — L30.9 HAND DERMATITIS: ICD-10-CM

## 2025-03-03 DIAGNOSIS — L20.84 INTRINSIC ATOPIC DERMATITIS: Primary | ICD-10-CM

## 2025-03-03 PROCEDURE — 3077F SYST BP >= 140 MM HG: CPT | Performed by: DERMATOLOGY

## 2025-03-03 PROCEDURE — 3079F DIAST BP 80-89 MM HG: CPT | Performed by: DERMATOLOGY

## 2025-03-03 PROCEDURE — 99213 OFFICE O/P EST LOW 20 MIN: CPT | Performed by: DERMATOLOGY

## 2025-03-03 PROCEDURE — 1123F ACP DISCUSS/DSCN MKR DOCD: CPT | Performed by: DERMATOLOGY

## 2025-03-03 PROCEDURE — 1159F MED LIST DOCD IN RCRD: CPT | Performed by: DERMATOLOGY

## 2025-03-03 NOTE — PATIENT INSTRUCTIONS
Hand dermatitis   Intrinsic atopic dermatitis   - stable chronic illness  - continue Dupixent, no SE  - continue following with ophthalmology   - keep hands moisturized

## 2025-04-01 ENCOUNTER — HOSPITAL ENCOUNTER (OUTPATIENT)
Dept: GENERAL RADIOLOGY | Age: 85
Discharge: HOME | End: 2025-04-03
Payer: MEDICARE

## 2025-04-01 ENCOUNTER — OFFICE VISIT (OUTPATIENT)
Dept: FAMILY MEDICINE CLINIC | Age: 85
End: 2025-04-01

## 2025-04-01 ENCOUNTER — HOSPITAL ENCOUNTER (OUTPATIENT)
Age: 85
Setting detail: SPECIMEN
Discharge: HOME | End: 2025-04-01

## 2025-04-01 ENCOUNTER — HOSPITAL ENCOUNTER (OUTPATIENT)
Age: 85
Discharge: HOME | End: 2025-04-03
Payer: MEDICARE

## 2025-04-01 ENCOUNTER — RESULTS FOLLOW-UP (OUTPATIENT)
Dept: FAMILY MEDICINE CLINIC | Age: 85
End: 2025-04-01

## 2025-04-01 VITALS
HEART RATE: 67 BPM | BODY MASS INDEX: 26.86 KG/M2 | DIASTOLIC BLOOD PRESSURE: 73 MMHG | WEIGHT: 133 LBS | SYSTOLIC BLOOD PRESSURE: 147 MMHG | TEMPERATURE: 97.6 F | OXYGEN SATURATION: 98 %

## 2025-04-01 DIAGNOSIS — E11.42 TYPE 2 DIABETES MELLITUS WITH PERIPHERAL NEUROPATHY (HCC): ICD-10-CM

## 2025-04-01 DIAGNOSIS — R05.1 ACUTE COUGH: Primary | ICD-10-CM

## 2025-04-01 DIAGNOSIS — I10 ESSENTIAL HYPERTENSION: ICD-10-CM

## 2025-04-01 DIAGNOSIS — R11.2 NAUSEA AND VOMITING, UNSPECIFIED VOMITING TYPE: ICD-10-CM

## 2025-04-01 DIAGNOSIS — E78.5 DYSLIPIDEMIA: ICD-10-CM

## 2025-04-01 DIAGNOSIS — R05.1 ACUTE COUGH: ICD-10-CM

## 2025-04-01 DIAGNOSIS — J34.89 RHINORRHEA: ICD-10-CM

## 2025-04-01 LAB
ALBUMIN SERPL-MCNC: 3.5 G/DL (ref 3.5–5.2)
ALBUMIN/GLOB SERPL: 1.1 {RATIO} (ref 1–2.5)
ALP SERPL-CCNC: 100 U/L (ref 35–104)
ALT SERPL-CCNC: 17 U/L (ref 10–35)
ANION GAP SERPL CALCULATED.3IONS-SCNC: 12 MMOL/L (ref 9–16)
AST SERPL-CCNC: 24 U/L (ref 10–35)
BILIRUB SERPL-MCNC: 0.9 MG/DL (ref 0–1.2)
BUN SERPL-MCNC: 24 MG/DL (ref 8–23)
CALCIUM SERPL-MCNC: 9.5 MG/DL (ref 8.6–10.4)
CHLORIDE SERPL-SCNC: 106 MMOL/L (ref 98–107)
CHOLEST SERPL-MCNC: 91 MG/DL (ref 0–199)
CHOLESTEROL/HDL RATIO: 2.5
CO2 SERPL-SCNC: 25 MMOL/L (ref 20–31)
CREAT SERPL-MCNC: 0.8 MG/DL (ref 0.6–0.9)
ERYTHROCYTE [DISTWIDTH] IN BLOOD BY AUTOMATED COUNT: 14 % (ref 11.8–14.4)
GFR, ESTIMATED: 73 ML/MIN/1.73M2
GLUCOSE SERPL-MCNC: 132 MG/DL (ref 74–99)
HCT VFR BLD AUTO: 38.2 % (ref 36.3–47.1)
HDLC SERPL-MCNC: 37 MG/DL
HGB BLD-MCNC: 12.5 G/DL (ref 11.9–15.1)
LDLC SERPL CALC-MCNC: 34 MG/DL (ref 0–100)
MCH RBC QN AUTO: 30.3 PG (ref 25.2–33.5)
MCHC RBC AUTO-ENTMCNC: 32.7 G/DL (ref 28.4–34.8)
MCV RBC AUTO: 92.5 FL (ref 82.6–102.9)
NRBC BLD-RTO: 0 PER 100 WBC
PLATELET # BLD AUTO: 509 K/UL (ref 138–453)
PMV BLD AUTO: 10.1 FL (ref 8.1–13.5)
POTASSIUM SERPL-SCNC: 4.5 MMOL/L (ref 3.7–5.3)
PROT SERPL-MCNC: 6.7 G/DL (ref 6.6–8.7)
RBC # BLD AUTO: 4.13 M/UL (ref 3.95–5.11)
SODIUM SERPL-SCNC: 143 MMOL/L (ref 136–145)
TRIGL SERPL-MCNC: 102 MG/DL (ref 0–149)
VLDLC SERPL CALC-MCNC: 20 MG/DL (ref 1–30)
WBC OTHER # BLD: 8.2 K/UL (ref 3.5–11.3)

## 2025-04-01 PROCEDURE — 71046 X-RAY EXAM CHEST 2 VIEWS: CPT

## 2025-04-01 RX ORDER — AZITHROMYCIN 250 MG/1
TABLET, FILM COATED ORAL
Qty: 6 TABLET | Refills: 0 | Status: SHIPPED | OUTPATIENT
Start: 2025-04-01 | End: 2025-04-11

## 2025-04-01 RX ORDER — ONDANSETRON 4 MG/1
4 TABLET, ORALLY DISINTEGRATING ORAL EVERY 6 HOURS PRN
Qty: 21 TABLET | Refills: 0 | Status: SHIPPED | OUTPATIENT
Start: 2025-04-01

## 2025-04-01 RX ORDER — BENZONATATE 100 MG/1
100 CAPSULE ORAL 3 TIMES DAILY PRN
Qty: 21 CAPSULE | Refills: 0 | Status: SHIPPED | OUTPATIENT
Start: 2025-04-01 | End: 2025-04-08

## 2025-04-01 ASSESSMENT — ENCOUNTER SYMPTOMS
DIARRHEA: 1
HEMOPTYSIS: 0
CHOKING: 0
STRIDOR: 0
COUGH: 1
SHORTNESS OF BREATH: 0
NAUSEA: 1
WHEEZING: 0
RHINORRHEA: 0
CHEST TIGHTNESS: 0
SORE THROAT: 0
VOMITING: 1
ABDOMINAL PAIN: 0
HEARTBURN: 0

## 2025-04-01 NOTE — PATIENT INSTRUCTIONS
No fried, fatty greasy foods  Try saltines, chicken noodle soup  Toast, jello  Powerade or gatorade

## 2025-04-01 NOTE — PROGRESS NOTES
[facility] PHYSICIANS [facility] WALK-IN FAMILY MEDICINE  [address]  [address]  [facility] [address]  Dept: [phone]  Dept Fax: [phone]    Lindy Mclaughlin is a 85 y.o. female who presents to the urgent care today for her medical conditions/complaints as notedbelow.  Lindy Mclaughlin is c/o of Vomiting (Onset for 2 weeks with body aches, loose/watery diarrhea and coughing( sx's are improving past two days). Otc cough medicine and Pepcid. Hasn't taken prescribed medication on a daily basis. Has appointment with pcp on 5/1. )      HPI:     85 yr old female presents with her son for gen body aches, loose watery diarrhea, n/v and coughing, gen body aches for approx 1 week - sx improving over last 2 days  Taking otc cough med and pepcid  PCP appt in 1 month  Sx better, no n/v, no diarrhea, body aches stopped in last 2-3 days. At this point has cough and fatigue, coughing up green/yellow to white, stomach still sensitive but not nauseated, feeling hungry but afraid to eat  No abdominal pain, no fevers, no sob or wheezing, no hx lung disease  Son ill with uri sx but no n/v/d.       Cough  This is a new problem. The current episode started 1 to 4 weeks ago. The problem has been waxing and waning. The cough is Productive of sputum and productive of purulent sputum (green at times). Associated symptoms include myalgias (resolved) and postnasal drip. Pertinent negatives include no chest pain, chills, ear congestion, ear pain, fever, headaches, heartburn, hemoptysis, nasal congestion, rash, rhinorrhea, sore throat, shortness of breath, sweats, weight loss or wheezing. Associated symptoms comments: N/v/diarrhea - resolved. Nothing aggravates the symptoms. She has tried OTC cough suppressant (robitussin, mucines, ensure) for the symptoms. The treatment provided mild relief. There is no history of asthma, COPD, emphysema or pneumonia.       Past Medical History:

## 2025-05-05 ENCOUNTER — HOSPITAL ENCOUNTER (OUTPATIENT)
Age: 85
Setting detail: SPECIMEN
Discharge: HOME | End: 2025-05-05

## 2025-05-06 DIAGNOSIS — E11.42 TYPE 2 DIABETES MELLITUS WITH PERIPHERAL NEUROPATHY (HCC): ICD-10-CM

## 2025-05-06 LAB
CREAT UR-MCNC: 78.7 MG/DL (ref 28–217)
MICROALBUMIN UR-MCNC: 15 MG/L (ref 0–20)
MICROALBUMIN/CREAT UR-RTO: 19 MCG/MG CREAT (ref 0–25)

## 2025-07-11 ENCOUNTER — HOSPITAL ENCOUNTER (OUTPATIENT)
Age: 85
Setting detail: SPECIMEN
Discharge: HOME | End: 2025-07-11

## 2025-07-11 DIAGNOSIS — R30.0 BURNING WITH URINATION: ICD-10-CM

## 2025-07-11 DIAGNOSIS — R10.30 LOWER ABDOMINAL PAIN: ICD-10-CM

## 2025-07-13 LAB
MICROORGANISM SPEC CULT: ABNORMAL
SERVICE CMNT-IMP: ABNORMAL
SPECIMEN DESCRIPTION: ABNORMAL